# Patient Record
Sex: FEMALE | Race: WHITE | NOT HISPANIC OR LATINO | Employment: UNEMPLOYED | URBAN - METROPOLITAN AREA
[De-identification: names, ages, dates, MRNs, and addresses within clinical notes are randomized per-mention and may not be internally consistent; named-entity substitution may affect disease eponyms.]

---

## 2024-10-04 ENCOUNTER — TELEPHONE (OUTPATIENT)
Dept: PRIMARY CARE | Facility: CLINIC | Age: 59
End: 2024-10-04
Payer: COMMERCIAL

## 2024-10-15 ENCOUNTER — APPOINTMENT (OUTPATIENT)
Dept: LAB | Facility: LAB | Age: 59
End: 2024-10-15
Payer: COMMERCIAL

## 2024-10-15 ENCOUNTER — APPOINTMENT (OUTPATIENT)
Dept: PRIMARY CARE | Facility: CLINIC | Age: 59
End: 2024-10-15
Payer: COMMERCIAL

## 2024-10-15 VITALS
BODY MASS INDEX: 36.57 KG/M2 | SYSTOLIC BLOOD PRESSURE: 117 MMHG | DIASTOLIC BLOOD PRESSURE: 83 MMHG | WEIGHT: 174.2 LBS | HEART RATE: 78 BPM | OXYGEN SATURATION: 96 % | HEIGHT: 58 IN

## 2024-10-15 DIAGNOSIS — G45.9 TIA (TRANSIENT ISCHEMIC ATTACK): Primary | ICD-10-CM

## 2024-10-15 DIAGNOSIS — Z00.00 ROUTINE GENERAL MEDICAL EXAMINATION AT A HEALTH CARE FACILITY: ICD-10-CM

## 2024-10-15 PROCEDURE — 99204 OFFICE O/P NEW MOD 45 MIN: CPT | Performed by: FAMILY MEDICINE

## 2024-10-15 PROCEDURE — 1036F TOBACCO NON-USER: CPT | Performed by: FAMILY MEDICINE

## 2024-10-15 PROCEDURE — 3008F BODY MASS INDEX DOCD: CPT | Performed by: FAMILY MEDICINE

## 2024-10-15 RX ORDER — NAPROXEN SODIUM 220 MG/1
81 TABLET, FILM COATED ORAL ONCE
COMMUNITY
Start: 2024-10-03

## 2024-10-15 RX ORDER — ACETAMINOPHEN 500 MG
2000 TABLET ORAL DAILY
COMMUNITY

## 2024-10-15 RX ORDER — PRAVASTATIN SODIUM 20 MG/1
TABLET ORAL
Qty: 90 TABLET | Refills: 0 | Status: SHIPPED | OUTPATIENT
Start: 2024-10-15 | End: 2024-10-18 | Stop reason: SINTOL

## 2024-10-15 ASSESSMENT — PATIENT HEALTH QUESTIONNAIRE - PHQ9
8. MOVING OR SPEAKING SO SLOWLY THAT OTHER PEOPLE COULD HAVE NOTICED. OR THE OPPOSITE, BEING SO FIGETY OR RESTLESS THAT YOU HAVE BEEN MOVING AROUND A LOT MORE THAN USUAL: NOT AT ALL
SUM OF ALL RESPONSES TO PHQ QUESTIONS 1-9: 6
10. IF YOU CHECKED OFF ANY PROBLEMS, HOW DIFFICULT HAVE THESE PROBLEMS MADE IT FOR YOU TO DO YOUR WORK, TAKE CARE OF THINGS AT HOME, OR GET ALONG WITH OTHER PEOPLE: NOT DIFFICULT AT ALL
SUM OF ALL RESPONSES TO PHQ9 QUESTIONS 1 AND 2: 2
9. THOUGHTS THAT YOU WOULD BE BETTER OFF DEAD, OR OF HURTING YOURSELF: NOT AT ALL
7. TROUBLE CONCENTRATING ON THINGS, SUCH AS READING THE NEWSPAPER OR WATCHING TELEVISION: NOT AT ALL
2. FEELING DOWN, DEPRESSED OR HOPELESS: SEVERAL DAYS
4. FEELING TIRED OR HAVING LITTLE ENERGY: SEVERAL DAYS
3. TROUBLE FALLING OR STAYING ASLEEP OR SLEEPING TOO MUCH: MORE THAN HALF THE DAYS
5. POOR APPETITE OR OVEREATING: SEVERAL DAYS
1. LITTLE INTEREST OR PLEASURE IN DOING THINGS: SEVERAL DAYS
6. FEELING BAD ABOUT YOURSELF - OR THAT YOU ARE A FAILURE OR HAVE LET YOURSELF OR YOUR FAMILY DOWN: NOT AT ALL

## 2024-10-15 NOTE — PROGRESS NOTES
Subjective   Patient ID: Zaida Gustafson is a 58 y.o. female who presents for Establish Care (New patient to establish care wants to discuss mini stroke 10/2/24).    HPI     Patient is here to establish care.     [place] [facility] [place] for  Concern for Stroke/ Transient Ischemic Attack. Patient woke up asymptomatic and began developing symptoms around 6:30 am. Presented with left sided facial droop. She also states she had left sided facial paresthesias and left upper extremity weakness. Per patient she was unable to  with her left hand.  Patient came to [facility] ER.  CT head was negative.  Patient had MRI/MRA brain which was negative for any acute infarct.  Carotid Doppler was negative.  Patient had echocardiogram which was normal.  Neurology was consulted.  Patient symptoms facial droop and left hand weakness were resolved after coming to ER.  Patient was discharged home on 10/3/2024 with aspirin and statin.  Patient started taking atorvastatin was noticing some lip swelling after 2 days so stopped taking atorvastatin.  Taking baby aspirin as prescribed.  Some paresthesias had fully resolved by 10/05.     Patient is here to establish care and hospital discharge follow-up.  Patient now feeling much better.  Denies chest pain shortness of breath headache dizziness palpitation tingling or numbness.  Patient eating drinking well.  No new numbness or weakness.  Patient able to swallow fine.  Blood pressure normal today.    Patient regularly sees a gynecologist Dr. Carvalho who does her pap smear and mammogram. Patient is due for a mammogram.   Colonoscopy done in 2022.    Patient does not currently smoke. Quit in her 30's.     Patients father had colon cancer and multiple strokes.  Patients mother had alzheimer's.  Patients brother had lymphoma.    Past Medical History:   Diagnosis Date    Allergic     Cervicalgia 04/23/2021    Chronic neck and back pain    Headache, unspecified 04/23/2021     "Generalized headaches    Hypertrophy of breast 2021    Macromastia    Other specified health status     No pertinent past medical history    Stroke (Multi) 10/02/2024    Varicella       Past Surgical History:   Procedure Laterality Date    BREAST SURGERY  3/2021     SECTION, LOW TRANSVERSE  1996 &  1997    HYSTERECTOMY  2014    OTHER SURGICAL HISTORY  2021     section    OTHER SURGICAL HISTORY  2021    Hysterectomy    TUBAL LIGATION        Allergies   Allergen Reactions    Sulfa (Sulfonamide Antibiotics) Rash      Family History   Problem Relation Name Age of Onset    Stroke Mother Mom     Colon cancer Father Dad     Stroke Father Dad     Cancer Brother Paulino       Social History     Tobacco Use    Smoking status: Former     Current packs/day: 0.25     Average packs/day: 0.3 packs/day for 15.0 years (3.8 ttl pk-yrs)     Types: Cigarettes    Smokeless tobacco: Never   Vaping Use    Vaping status: Never Used   Substance Use Topics    Alcohol use: Yes     Alcohol/week: 3.0 standard drinks of alcohol     Types: 3 Standard drinks or equivalent per week     Comment: Social drink on weekends not every day    Drug use: Never      Objective   /83   Pulse 78   Ht 1.473 m (4' 10\")   Wt 79 kg (174 lb 3.2 oz)   SpO2 96%   BMI 36.41 kg/m²     Physical Exam    General Appearance:  Alert, cooperative, no distress,   Head:  Normocephalic, atraumatic   Eyes:  PERRL, conjunctiva/corneas clear, EOM's intact,    Lungs:   Clear to auscultation bilaterally, respirations unlabored   Heart:  Regular rate and rhythm, S1 and S2 normal, no murmur,    Neurologic: Alert and oriented x 3, Left sided horizontal nystagmus. CN II-XII intact. Light touch sensation intact. Reflexes 2+. Strength 5/5.     Assessment/Plan   Zaida was seen today for establish care.  Diagnoses and all orders for this visit:  TIA (transient ischemic attack) (Primary)  -     pravastatin (Pravachol) 20 mg tablet; 1/2 tab " once a day for 5 days f/by 1 tab daily  -     Holter or Event Cardiac Monitor; Future  Routine general medical examination at a health care facility  -     TSH with reflex to Free T4 if abnormal; Future  -     Lipid Panel; Future  -     Comprehensive Metabolic Panel; Future  -     CBC; Future  -     Urinalysis with Reflex Microscopic; Future        TIA   Intolerant to Lipitor  Start pravastatin 20 mg daily   Continue Aspirin 81 mg daily   Holter event cardiac monitor; future     Routine medical exam   Routine labs; future   Follow-up with gynecologist for mammogram.    Follow-up in one month    All hospital record including labs and imaging consult note reviewed.  Carotid Doppler normal  Echocardiogram normal  MRI brain negative.    patient was recently seen in the hospital and patient's hospital record has been reviewed with the patient including, but not limited to, discharge summary, labs, imaging, consultation notes (if pertinent). Ambulatory medication list and discharge hospitalization list has been compared and updated for changes.    I spent  45 minutes clinical time with this patient. greater than 50% of this time was spent in counseling and or coordination of care.

## 2024-10-16 ENCOUNTER — LAB (OUTPATIENT)
Dept: LAB | Facility: LAB | Age: 59
End: 2024-10-16
Payer: COMMERCIAL

## 2024-10-16 DIAGNOSIS — Z00.00 ROUTINE GENERAL MEDICAL EXAMINATION AT A HEALTH CARE FACILITY: ICD-10-CM

## 2024-10-16 LAB
ALBUMIN SERPL BCP-MCNC: 4.6 G/DL (ref 3.4–5)
ALP SERPL-CCNC: 82 U/L (ref 33–110)
ALT SERPL W P-5'-P-CCNC: 17 U/L (ref 7–45)
ANION GAP SERPL CALC-SCNC: 11 MMOL/L (ref 10–20)
APPEARANCE UR: CLEAR
AST SERPL W P-5'-P-CCNC: 14 U/L (ref 9–39)
BILIRUB SERPL-MCNC: 0.4 MG/DL (ref 0–1.2)
BILIRUB UR STRIP.AUTO-MCNC: NEGATIVE MG/DL
BUN SERPL-MCNC: 16 MG/DL (ref 6–23)
CALCIUM SERPL-MCNC: 9.5 MG/DL (ref 8.6–10.6)
CHLORIDE SERPL-SCNC: 107 MMOL/L (ref 98–107)
CHOLEST SERPL-MCNC: 174 MG/DL (ref 0–199)
CHOLESTEROL/HDL RATIO: 2.6
CO2 SERPL-SCNC: 29 MMOL/L (ref 21–32)
COLOR UR: ABNORMAL
CREAT SERPL-MCNC: 0.56 MG/DL (ref 0.5–1.05)
EGFRCR SERPLBLD CKD-EPI 2021: >90 ML/MIN/1.73M*2
ERYTHROCYTE [DISTWIDTH] IN BLOOD BY AUTOMATED COUNT: 12.4 % (ref 11.5–14.5)
GLUCOSE SERPL-MCNC: 88 MG/DL (ref 74–99)
GLUCOSE UR STRIP.AUTO-MCNC: NORMAL MG/DL
HCT VFR BLD AUTO: 42.2 % (ref 36–46)
HDLC SERPL-MCNC: 66.2 MG/DL
HGB BLD-MCNC: 13.6 G/DL (ref 12–16)
KETONES UR STRIP.AUTO-MCNC: NEGATIVE MG/DL
LDLC SERPL CALC-MCNC: 90 MG/DL
LEUKOCYTE ESTERASE UR QL STRIP.AUTO: ABNORMAL
MCH RBC QN AUTO: 29.8 PG (ref 26–34)
MCHC RBC AUTO-ENTMCNC: 32.2 G/DL (ref 32–36)
MCV RBC AUTO: 93 FL (ref 80–100)
NITRITE UR QL STRIP.AUTO: NEGATIVE
NON HDL CHOLESTEROL: 108 MG/DL (ref 0–149)
NRBC BLD-RTO: 0 /100 WBCS (ref 0–0)
PH UR STRIP.AUTO: 6 [PH]
PLATELET # BLD AUTO: 316 X10*3/UL (ref 150–450)
POTASSIUM SERPL-SCNC: 4.2 MMOL/L (ref 3.5–5.3)
PROT SERPL-MCNC: 7.1 G/DL (ref 6.4–8.2)
PROT UR STRIP.AUTO-MCNC: NEGATIVE MG/DL
RBC # BLD AUTO: 4.56 X10*6/UL (ref 4–5.2)
RBC # UR STRIP.AUTO: NEGATIVE /UL
RBC #/AREA URNS AUTO: NORMAL /HPF
SODIUM SERPL-SCNC: 143 MMOL/L (ref 136–145)
SP GR UR STRIP.AUTO: 1.02
TRIGL SERPL-MCNC: 89 MG/DL (ref 0–149)
TSH SERPL-ACNC: 0.95 MIU/L (ref 0.44–3.98)
UROBILINOGEN UR STRIP.AUTO-MCNC: NORMAL MG/DL
VLDL: 18 MG/DL (ref 0–40)
WBC # BLD AUTO: 5.6 X10*3/UL (ref 4.4–11.3)
WBC #/AREA URNS AUTO: NORMAL /HPF

## 2024-10-16 PROCEDURE — 80053 COMPREHEN METABOLIC PANEL: CPT

## 2024-10-16 PROCEDURE — 80061 LIPID PANEL: CPT

## 2024-10-16 PROCEDURE — 36415 COLL VENOUS BLD VENIPUNCTURE: CPT

## 2024-10-16 PROCEDURE — 84443 ASSAY THYROID STIM HORMONE: CPT

## 2024-10-16 PROCEDURE — 81001 URINALYSIS AUTO W/SCOPE: CPT

## 2024-10-16 PROCEDURE — 85027 COMPLETE CBC AUTOMATED: CPT

## 2024-10-17 ENCOUNTER — APPOINTMENT (OUTPATIENT)
Dept: PRIMARY CARE | Facility: CLINIC | Age: 59
End: 2024-10-17
Payer: COMMERCIAL

## 2024-10-18 ENCOUNTER — OFFICE VISIT (OUTPATIENT)
Dept: PRIMARY CARE | Facility: CLINIC | Age: 59
End: 2024-10-18
Payer: COMMERCIAL

## 2024-10-18 ENCOUNTER — TELEPHONE (OUTPATIENT)
Dept: PRIMARY CARE | Facility: CLINIC | Age: 59
End: 2024-10-18

## 2024-10-18 VITALS
SYSTOLIC BLOOD PRESSURE: 128 MMHG | BODY MASS INDEX: 35.74 KG/M2 | WEIGHT: 171 LBS | OXYGEN SATURATION: 98 % | HEART RATE: 86 BPM | DIASTOLIC BLOOD PRESSURE: 74 MMHG

## 2024-10-18 DIAGNOSIS — E78.49 OTHER HYPERLIPIDEMIA: ICD-10-CM

## 2024-10-18 DIAGNOSIS — R68.89 FLU-LIKE SYMPTOMS: ICD-10-CM

## 2024-10-18 DIAGNOSIS — E78.00 HIGH CHOLESTEROL: ICD-10-CM

## 2024-10-18 DIAGNOSIS — G45.9 TIA (TRANSIENT ISCHEMIC ATTACK): Primary | ICD-10-CM

## 2024-10-18 DIAGNOSIS — L50.9 HIVES: ICD-10-CM

## 2024-10-18 LAB
NON-UH HIE COVID-19 MOLECULAR SWED: NEGATIVE
NON-UH HIE RAPID INFLUENZA A ANTIGEN TEST: NEGATIVE
NON-UH HIE RAPID INFLUENZA B ANTIGEN TEST: NEGATIVE
NON-UH HIE RFAB INT QC: PRESENT

## 2024-10-18 PROCEDURE — 99214 OFFICE O/P EST MOD 30 MIN: CPT | Performed by: FAMILY MEDICINE

## 2024-10-18 RX ORDER — BEMPEDOIC ACID 180 MG/1
180 TABLET, FILM COATED ORAL DAILY
Qty: 90 TABLET | Refills: 0 | Status: SHIPPED | OUTPATIENT
Start: 2024-10-18

## 2024-10-18 ASSESSMENT — PATIENT HEALTH QUESTIONNAIRE - PHQ9
10. IF YOU CHECKED OFF ANY PROBLEMS, HOW DIFFICULT HAVE THESE PROBLEMS MADE IT FOR YOU TO DO YOUR WORK, TAKE CARE OF THINGS AT HOME, OR GET ALONG WITH OTHER PEOPLE: NOT DIFFICULT AT ALL
2. FEELING DOWN, DEPRESSED OR HOPELESS: SEVERAL DAYS
1. LITTLE INTEREST OR PLEASURE IN DOING THINGS: NOT AT ALL
SUM OF ALL RESPONSES TO PHQ9 QUESTIONS 1 AND 2: 1

## 2024-10-18 NOTE — TELEPHONE ENCOUNTER
PT thinks she is having a reaction to pravastatin (Pravachol) 20 mg tablet.    Sore throat yesterday and today not one.  Rash around breast area.    Please advise?

## 2024-10-18 NOTE — PROGRESS NOTES
Subjective   Patient ID: Zaida Gustafson is a 58 y.o. female who presents for No chief complaint on file.    HPI     Patient is here for rash.   Patient woke up with pruritus and noticed a rash on her left anterior rib cage. Took a Claritin this morning with minimal symptom relief. No seasonal allergies. Rash does not burn, tingle, or ache.   Recently started on pravastatin 20 mg daily after she experienced lip swelling with atorvastatin. Had taken two half doses of the medication prior to symptom onset.     Also had throat pain, sneezing, congestion, rhinorrhea, fatigue, and myalgias that started yesterday. No sore throat today. Denies cough, n/v/d.       Previous history  TIA - Patient was admited to [facility] [place] [place] on 10/02/2023 for  Concern for Stroke/ Transient Ischemic Attack. Patient woke up asymptomatic and began developing symptoms around 6:30 am. Presented with left sided facial droop. She also states she had left sided facial paresthesias and left upper extremity weakness. Per patient she was unable to  with her left hand.  Patient came to [facility] ER.  CT head was negative.  Patient had MRI/MRA brain which was negative for any acute infarct.  Carotid Doppler was negative.  Patient had echocardiogram which was normal.  Neurology was consulted.  Patient symptoms facial droop and left hand weakness were resolved after coming to ER.  Patient was discharged home on 10/3/2024 with aspirin and statin.  Patient started taking atorvastatin was noticing some lip swelling after 2 days so stopped taking atorvastatin.  Taking baby aspirin as prescribed.  Some paresthesias had fully resolved by 10/05.   Denies chest pain shortness of breath headache dizziness palpitation tingling or numbness.  Patient eating drinking well.  No new numbness or weakness.  Patient able to swallow fine.  Blood pressure normal today.    Patient regularly sees a gynecologist Dr. Carvalho who does her pap smear and mammogram. Patient is due for a  mammogram.   Colonoscopy done in .    Patient does not currently smoke. Quit in her 30's.     Patients father had colon cancer and multiple strokes.  Patients mother had alzheimer's.  Patients brother had lymphoma.    Past Medical History:   Diagnosis Date    Allergic     Cervicalgia 2021    Chronic neck and back pain    Headache, unspecified 2021    Generalized headaches    Hypertrophy of breast 2021    Macromastia    Other specified health status     No pertinent past medical history    Stroke (Multi) 10/02/2024    Varicella       Past Surgical History:   Procedure Laterality Date    BREAST SURGERY  3/2021     SECTION, LOW TRANSVERSE  1996 &  1997    HYSTERECTOMY  2014    OTHER SURGICAL HISTORY  2021     section    OTHER SURGICAL HISTORY  2021    Hysterectomy    TUBAL LIGATION        Allergies   Allergen Reactions    Sulfa (Sulfonamide Antibiotics) Rash      Family History   Problem Relation Name Age of Onset    Stroke Mother Mom     Colon cancer Father Dad     Stroke Father Dad     Cancer Brother Paulino       Social History     Tobacco Use    Smoking status: Former     Current packs/day: 0.25     Average packs/day: 0.3 packs/day for 15.0 years (3.8 ttl pk-yrs)     Types: Cigarettes    Smokeless tobacco: Never   Vaping Use    Vaping status: Never Used   Substance Use Topics    Alcohol use: Yes     Alcohol/week: 3.0 standard drinks of alcohol     Types: 3 Standard drinks or equivalent per week     Comment: Social drink on weekends not every day    Drug use: Never      Objective   There were no vitals taken for this visit.    Physical Exam    General Appearance:  Alert, cooperative, no distress,   Head:  Normocephalic, atraumatic   Eyes:  PERRL, conjunctiva/corneas clear, EOM's intact,    Lungs:   Clear to auscultation bilaterally, respirations unlabored   Heart:  Regular rate and rhythm, S1 and S2 normal, no murmur,    Neurologic: Normal   Dermatologic:   Multiple pink wheals on the      Assessment/Plan   Zaida was seen today for rash.  Diagnoses and all orders for this visit:  TIA (transient ischemic attack) (Primary)  -     bempedoic acid (Nexletol) 180 mg tablet; Take 180 mg by mouth once daily.  Other hyperlipidemia  -     bempedoic acid (Nexletol) 180 mg tablet; Take 180 mg by mouth once daily.  Flu-like symptoms  Hives        URI symptoms   COVID PCR; Future  Influenza PCR; Future    Urticaria   Pt allergic to STATIN with Lip swelling  and Hives  Discontinue pravastatin  Continue OTC antihistamine    TIA   Intolerant/ allergic to Lipitor and pravastatin  Start Nexletol 180 mg  Continue Aspirin 81 mg daily   Holter event cardiac monitor; future       Follow-up in one month

## 2024-11-13 ENCOUNTER — APPOINTMENT (OUTPATIENT)
Dept: PRIMARY CARE | Facility: CLINIC | Age: 59
End: 2024-11-13
Payer: COMMERCIAL

## 2024-11-13 VITALS
SYSTOLIC BLOOD PRESSURE: 119 MMHG | DIASTOLIC BLOOD PRESSURE: 80 MMHG | HEART RATE: 83 BPM | OXYGEN SATURATION: 95 % | WEIGHT: 176.6 LBS | HEIGHT: 58 IN | BODY MASS INDEX: 37.07 KG/M2

## 2024-11-13 DIAGNOSIS — Z00.00 ROUTINE GENERAL MEDICAL EXAMINATION AT A HEALTH CARE FACILITY: Primary | ICD-10-CM

## 2024-11-13 DIAGNOSIS — G45.9 TIA (TRANSIENT ISCHEMIC ATTACK): ICD-10-CM

## 2024-11-13 DIAGNOSIS — E78.49 OTHER HYPERLIPIDEMIA: ICD-10-CM

## 2024-11-13 DIAGNOSIS — Z12.31 ENCOUNTER FOR SCREENING MAMMOGRAM FOR BREAST CANCER: ICD-10-CM

## 2024-11-13 PROCEDURE — 90471 IMMUNIZATION ADMIN: CPT | Performed by: FAMILY MEDICINE

## 2024-11-13 PROCEDURE — 99396 PREV VISIT EST AGE 40-64: CPT | Performed by: FAMILY MEDICINE

## 2024-11-13 PROCEDURE — 1036F TOBACCO NON-USER: CPT | Performed by: FAMILY MEDICINE

## 2024-11-13 PROCEDURE — 3008F BODY MASS INDEX DOCD: CPT | Performed by: FAMILY MEDICINE

## 2024-11-13 PROCEDURE — 90688 IIV4 VACCINE SPLT 0.5 ML IM: CPT | Performed by: FAMILY MEDICINE

## 2024-11-13 RX ORDER — NITROFURANTOIN 25; 75 MG/1; MG/1
100 CAPSULE ORAL
COMMUNITY
Start: 2024-10-28

## 2024-11-13 ASSESSMENT — PATIENT HEALTH QUESTIONNAIRE - PHQ9
SUM OF ALL RESPONSES TO PHQ9 QUESTIONS 1 AND 2: 4
5. POOR APPETITE OR OVEREATING: SEVERAL DAYS
9. THOUGHTS THAT YOU WOULD BE BETTER OFF DEAD, OR OF HURTING YOURSELF: NOT AT ALL
6. FEELING BAD ABOUT YOURSELF - OR THAT YOU ARE A FAILURE OR HAVE LET YOURSELF OR YOUR FAMILY DOWN: NOT AT ALL
10. IF YOU CHECKED OFF ANY PROBLEMS, HOW DIFFICULT HAVE THESE PROBLEMS MADE IT FOR YOU TO DO YOUR WORK, TAKE CARE OF THINGS AT HOME, OR GET ALONG WITH OTHER PEOPLE: SOMEWHAT DIFFICULT
4. FEELING TIRED OR HAVING LITTLE ENERGY: MORE THAN HALF THE DAYS
8. MOVING OR SPEAKING SO SLOWLY THAT OTHER PEOPLE COULD HAVE NOTICED. OR THE OPPOSITE, BEING SO FIGETY OR RESTLESS THAT YOU HAVE BEEN MOVING AROUND A LOT MORE THAN USUAL: NOT AT ALL
1. LITTLE INTEREST OR PLEASURE IN DOING THINGS: MORE THAN HALF THE DAYS
2. FEELING DOWN, DEPRESSED OR HOPELESS: MORE THAN HALF THE DAYS
3. TROUBLE FALLING OR STAYING ASLEEP OR SLEEPING TOO MUCH: MORE THAN HALF THE DAYS
7. TROUBLE CONCENTRATING ON THINGS, SUCH AS READING THE NEWSPAPER OR WATCHING TELEVISION: MORE THAN HALF THE DAYS
SUM OF ALL RESPONSES TO PHQ QUESTIONS 1-9: 11

## 2024-11-13 NOTE — PROGRESS NOTES
Subjective   Patient ID: Zaida Gustafson is a 58 y.o. female who presents for Follow-up (physical)    HPI     Patient is here for complete physical.  Patient currently trying to watch diet not exercising  Medication-h/o TIA patient taking baby aspirin 81 mg, allergic to Statin   Supplements- turmeric and ty  Denies smoking/ alcohol or drug use.   Patient does not currently smoke. Quit in her 30's.  Colonoscopy- Colonoscopy done in 2022.  Patient regularly sees a gynecologist Dr. Carvalho who does her pap smear and mammogram. Patient is due for a mammogram. Order given.  Pt had Hysterectomy  at age 43  Patient was advised to take calcium and vitamin D twice daily.        Previous history  TIA - Patient was admited to [facility] [place] on 10/02/2023 for  Concern for Stroke/ Transient Ischemic Attack. Patient woke up asymptomatic and began developing symptoms around 6:30 am. Presented with left sided facial droop. She also states she had left sided facial paresthesias and left upper extremity weakness. Per patient she was unable to  with her left hand.  Patient came to [facility] ER.  CT head was negative.  Patient had MRI/MRA brain which was negative for any acute infarct.  Carotid Doppler was negative.  Patient had echocardiogram which was normal.  Neurology was consulted.  Patient symptoms facial droop and left hand weakness were resolved after coming to ER.  Patient was discharged home on 10/3/2024 with aspirin and statin.  Patient started taking atorvastatin was noticing some lip swelling after 2 days so stopped taking atorvastatin.  Taking baby aspirin as prescribed.  Some paresthesias had fully resolved by 10/05.   Patients father had colon cancer and multiple strokes.  Patients mother had alzheimer's.  Patients brother had lymphoma.    Past Medical History:   Diagnosis Date    Allergic     Cervicalgia 04/23/2021    Chronic neck and back pain    Headache, unspecified 04/23/2021    Generalized headaches    Hypertrophy of  breast 2021    Macromastia    Other specified health status     No pertinent past medical history    Stroke (Multi) 10/02/2024    Varicella       Past Surgical History:   Procedure Laterality Date    BREAST SURGERY  3/2021     SECTION, LOW TRANSVERSE  1996 &  1997    HYSTERECTOMY  2014    OTHER SURGICAL HISTORY  2021     section    OTHER SURGICAL HISTORY  2021    Hysterectomy    TUBAL LIGATION        Allergies   Allergen Reactions    Statins-Hmg-Coa Reductase Inhibitors Hives    Sulfa (Sulfonamide Antibiotics) Rash and Hives      Family History   Problem Relation Name Age of Onset    Stroke Mother Mom     Colon cancer Father Dad     Stroke Father Dad     Cancer Brother Paulino       Social History     Tobacco Use    Smoking status: Former     Current packs/day: 0.25     Average packs/day: 0.3 packs/day for 15.0 years (3.8 ttl pk-yrs)     Types: Cigarettes    Smokeless tobacco: Never   Vaping Use    Vaping status: Never Used   Substance Use Topics    Alcohol use: Not Currently     Alcohol/week: 3.0 standard drinks of alcohol     Types: 3 Standard drinks or equivalent per week     Comment: Social drink on weekends not every day    Drug use: Never      Current Outpatient Medications on File Prior to Visit   Medication Sig Dispense Refill    aspirin 81 mg chewable tablet Chew 1 tablet (81 mg) 1 time.      cholecalciferol (Vitamin D3) 50 mcg (2,000 unit) capsule Take 1 capsule (50 mcg) by mouth early in the morning..      nitrofurantoin, macrocrystal-monohydrate, (Macrobid) 100 mg capsule Take 1 capsule (100 mg) by mouth.      bempedoic acid (Nexletol) 180 mg tablet Take 180 mg by mouth once daily. (Patient not taking: Reported on 2024) 90 tablet 0    [DISCONTINUED] bempedoic acid 180 mg tablet Take 180 mg by mouth once daily. (Patient not taking: Reported on 2024) 90 tablet 1     No current facility-administered medications on file prior to visit.       Objective   BP  "119/80   Pulse 83   Ht 1.473 m (4' 10\")   Wt 80.1 kg (176 lb 9.6 oz)   SpO2 95%   BMI 36.91 kg/m²     Physical Exam    General    Alert, oriented x 3 ,cooperative, no distress,    Head:    Normocephalic, without obvious abnormality, atraumatic   Eyes:    PERRL, conjunctiva/corneas clear, EOM's intact,    Neck:   Supple, symmetrical, No enlargement   Back:     Symmetric, no curvature, ROM normal, no CVA tenderness   Lungs:     Clear to auscultation bilaterally, respirations normal ,no wheezing or ronchi   Heart:    Regular rate and rhythm, S1 and S2 normal, no murmur, rub or gallop   Abdomen:     Soft, non-tender, bowel sounds active all four quadrants, no masses,   no organomegaly   Lymph nodes:  SKIN   Cervical Lymphnodes normal  Normal color , no rashes   Neurologic:   Gait normal strength, sensation normal       Lab Results   Component Value Date    TSH 0.95 10/16/2024       Lipid Profile:  Lab Results   Component Value Date    TRIG 89 10/16/2024    HDL 66.2 10/16/2024    LDLCALC 90 10/16/2024     Immunization History   Administered Date(s) Administered    Influenza, injectable, quadrivalent 12/02/2021, 10/10/2022, 10/11/2023    MMR vaccine, subcutaneous (MMR II) 12/09/2021, 02/04/2022    Pfizer Purple Cap SARS-CoV-2 03/19/2021, 04/09/2021, 11/30/2021    Zoster vaccine, recombinant, adult (SHINGRIX) 05/07/2022, 08/24/2022       Assessment/Plan     Zaida was seen today for follow-up.  Diagnoses and all orders for this visit:  Routine general medical examination at a health care facility (Primary)  Encounter for screening mammogram for breast cancer  -     BI mammo bilateral screening; Future  Other hyperlipidemia  TIA (transient ischemic attack)    Urticaria -resolved  Pt allergic to STATIN with Lip swelling  and Hives  Off  pravastatin      TIA   Intolerant/ allergic to Lipitor and pravastatin  PA for Nexletol 180 mg denied  Continue Aspirin 81 mg daily   Patient has cardiac monitor for 30 days.    " Advised to follow-up with cardiology.    Labs From October 2024 reviewed.  Colonoscopy up-to-date 2022  Patient following Dr. Gates  Get mammogram  Patient following GYN Dr. Martinez for Pap smear  Declined flu shot  Had Tdap 5 years ago  Shingrix up-to-date.    Follow-up in 3-4 months

## 2024-11-21 DIAGNOSIS — G45.9 TIA (TRANSIENT ISCHEMIC ATTACK): ICD-10-CM

## 2024-11-21 DIAGNOSIS — E78.49 OTHER HYPERLIPIDEMIA: ICD-10-CM

## 2024-11-21 RX ORDER — BEMPEDOIC ACID 180 MG/1
180 TABLET, FILM COATED ORAL DAILY
Qty: 90 TABLET | Refills: 1 | Status: SHIPPED | OUTPATIENT
Start: 2024-11-21

## 2024-11-27 DIAGNOSIS — G45.9 TIA (TRANSIENT ISCHEMIC ATTACK): ICD-10-CM

## 2025-01-15 ENCOUNTER — APPOINTMENT (OUTPATIENT)
Dept: PRIMARY CARE | Facility: CLINIC | Age: 60
End: 2025-01-15
Payer: COMMERCIAL

## 2025-01-15 ENCOUNTER — LAB (OUTPATIENT)
Dept: LAB | Facility: LAB | Age: 60
End: 2025-01-15
Payer: COMMERCIAL

## 2025-01-15 VITALS
HEART RATE: 84 BPM | DIASTOLIC BLOOD PRESSURE: 72 MMHG | OXYGEN SATURATION: 96 % | BODY MASS INDEX: 37.87 KG/M2 | HEIGHT: 58 IN | WEIGHT: 180.4 LBS | SYSTOLIC BLOOD PRESSURE: 105 MMHG

## 2025-01-15 DIAGNOSIS — G45.9 TIA (TRANSIENT ISCHEMIC ATTACK): Primary | ICD-10-CM

## 2025-01-15 DIAGNOSIS — E78.49 OTHER HYPERLIPIDEMIA: ICD-10-CM

## 2025-01-15 DIAGNOSIS — E66.09 OBESITY DUE TO EXCESS CALORIES WITH SERIOUS COMORBIDITY, UNSPECIFIED CLASS: ICD-10-CM

## 2025-01-15 DIAGNOSIS — R73.02 IGT (IMPAIRED GLUCOSE TOLERANCE): ICD-10-CM

## 2025-01-15 LAB
EST. AVERAGE GLUCOSE BLD GHB EST-MCNC: 97 MG/DL
HBA1C MFR BLD: 5 %

## 2025-01-15 PROCEDURE — 99214 OFFICE O/P EST MOD 30 MIN: CPT | Performed by: FAMILY MEDICINE

## 2025-01-15 PROCEDURE — 83036 HEMOGLOBIN GLYCOSYLATED A1C: CPT

## 2025-01-15 PROCEDURE — G0447 BEHAVIOR COUNSEL OBESITY 15M: HCPCS | Performed by: FAMILY MEDICINE

## 2025-01-15 PROCEDURE — 3008F BODY MASS INDEX DOCD: CPT | Performed by: FAMILY MEDICINE

## 2025-01-15 PROCEDURE — 1036F TOBACCO NON-USER: CPT | Performed by: FAMILY MEDICINE

## 2025-01-15 RX ORDER — LANOLIN ALCOHOL/MO/W.PET/CERES
50 CREAM (GRAM) TOPICAL DAILY
COMMUNITY

## 2025-01-15 RX ORDER — EZETIMIBE 10 MG/1
1 TABLET ORAL
COMMUNITY
Start: 2025-01-06

## 2025-01-15 ASSESSMENT — PATIENT HEALTH QUESTIONNAIRE - PHQ9
SUM OF ALL RESPONSES TO PHQ9 QUESTIONS 1 AND 2: 0
2. FEELING DOWN, DEPRESSED OR HOPELESS: NOT AT ALL
1. LITTLE INTEREST OR PLEASURE IN DOING THINGS: NOT AT ALL

## 2025-01-15 NOTE — PROGRESS NOTES
Subjective   Patient ID: Zaida Gustafson is a 59 y.o. female who presents for Follow-up    HPI     Patient is here for follow up.  Pt had stress test and Holter monitoring done with Cardiology Dr. Napoles  Also had stress test  which was normal.   Patient currently trying to watch diet.    Doing Yoga and cardio for 45 mins daily for last  1-2 months.  Avoiding Fried food and avoiding carbs and eating more salads.  Patient would like to lose weight.  Asking for Ozempic.  Medication-h/o TIA patient taking baby aspirin 81 mg, allergic to Statin   Supplements- turmeric and ty      Previous history  TIA - Patient was [place]ited [place] [facility] [place] for  Concern for Stroke/ Transient Ischemic Attack. Patient woke up asymptomatic and began developing symptoms around 6:30 am. Presented with left sided facial droop. She also states she had left sided facial paresthesias and left upper extremity weakness. Per patient she was unable to  with her left hand.  Patient came to [facility] ER.  CT head was negative.  Patient had MRI/MRA brain which was negative for any acute infarct.  Carotid Doppler was negative.  Patient had echocardiogram which was normal.  Neurology was consulted.  Patient symptoms facial droop and left hand weakness were resolved after coming to ER.  Patient was discharged home on 10/3/2024 with aspirin and statin.  Patient started taking atorvastatin was noticing some lip swelling after 2 days so stopped taking atorvastatin.  Taking baby aspirin as prescribed.  Some paresthesias had fully resolved by 10/05.   Patients father had colon cancer and multiple strokes.  Patients mother had alzheimer's.  Patients brother had lymphoma.    Past Medical History:   Diagnosis Date    Allergic     Cervicalgia 04/23/2021    Chronic neck and back pain    Headache, unspecified 04/23/2021    Generalized headaches    Hypertrophy of breast 04/23/2021    Macromastia    Other specified health status     No pertinent past medical  history    Stroke (Multi) 10/02/2024    Varicella       Past Surgical History:   Procedure Laterality Date    BREAST SURGERY  3/2021     SECTION, LOW TRANSVERSE  1996 &  1997    HYSTERECTOMY  2014    OTHER SURGICAL HISTORY  2021     section    OTHER SURGICAL HISTORY  2021    Hysterectomy    TUBAL LIGATION        Allergies   Allergen Reactions    Statins-Hmg-Coa Reductase Inhibitors Hives    Sulfa (Sulfonamide Antibiotics) Rash and Hives      Family History   Problem Relation Name Age of Onset    Stroke Mother Mom     Colon cancer Father Dad     Stroke Father Dad     Cancer Brother Paulino       Social History     Tobacco Use    Smoking status: Former     Current packs/day: 0.25     Average packs/day: 0.3 packs/day for 15.0 years (3.8 ttl pk-yrs)     Types: Cigarettes    Smokeless tobacco: Never   Vaping Use    Vaping status: Never Used   Substance Use Topics    Alcohol use: Yes     Alcohol/week: 3.0 standard drinks of alcohol     Types: 3 Standard drinks or equivalent per week     Comment: Social drink on weekends not every day    Drug use: Never      Current Outpatient Medications on File Prior to Visit   Medication Sig Dispense Refill    aspirin 81 mg chewable tablet Chew 1 tablet (81 mg) 1 time.      cholecalciferol (Vitamin D3) 50 mcg (2,000 unit) capsule Take 1 capsule (50 mcg) by mouth early in the morning..      ezetimibe (Zetia) 10 mg tablet Take 1 tablet (10 mg) by mouth early in the morning..      nitrofurantoin, macrocrystal-monohydrate, (Macrobid) 100 mg capsule Take 1 capsule (100 mg) by mouth. (Patient taking differently: Take 1 capsule (100 mg) by mouth if needed.)      pyridoxine (Vitamin B-6) 50 mg tablet Take 1 tablet (50 mg) by mouth once daily.      bempedoic acid (Nexletol) 180 mg tablet Take 180 mg by mouth once daily. (Patient not taking: Reported on 1/15/2025) 90 tablet 1     No current facility-administered medications on file prior to visit.       Objective  "  /72   Pulse 84   Ht 1.473 m (4' 10\")   Wt 81.8 kg (180 lb 6.4 oz)   SpO2 96%   BMI 37.70 kg/m²       General Appearance:  Alert, cooperative, no distress,   Head:  Normocephalic, atraumatic   Eyes:  PERRL, conjunctiva/corneas clear, EOM's intact,    Lungs:   Clear to auscultation bilaterally, respirations unlabored   Heart:  Regular rate and rhythm, S1 and S2 normal, no murmur,    Extremities: Extremities normal, No edema   Neurologic: Normal         Lab Results   Component Value Date    TSH 0.95 10/16/2024       Lipid Profile:  Lab Results   Component Value Date    TRIG 89 10/16/2024    HDL 66.2 10/16/2024    LDLCALC 90 10/16/2024     Immunization History   Administered Date(s) Administered    COVID-19, mRNA, LNP-S, PF, 30 mcg/0.3 mL dose 03/19/2021, 04/09/2021, 11/30/2021    Influenza, injectable, quadrivalent 12/02/2021, 10/10/2022, 10/11/2023    MMR vaccine, subcutaneous (MMR II) 12/09/2021, 02/04/2022    Zoster vaccine, recombinant, adult (SHINGRIX) 05/07/2022, 08/24/2022       Assessment/Plan     Zaida was seen today for follow-up.  Diagnoses and all orders for this visit:  TIA (transient ischemic attack) (Primary)  Other hyperlipidemia  BMI 37.0-37.9, adult  -     Hemoglobin A1c; Future  -     semaglutide, weight loss, 0.5 mg/0.5 mL pen injector; Inject 0.5 mg under the skin every 7 days.  Obesity due to excess calories with serious comorbidity, unspecified class  -     Hemoglobin A1c; Future  -     semaglutide, weight loss, 0.5 mg/0.5 mL pen injector; Inject 0.5 mg under the skin every 7 days.  IGT (impaired glucose tolerance)  -     Hemoglobin A1c; Future    Patient was advised to continue diet and exercise  Will start semaglutide 0.5 mg once a week  Reevaluate for weight in 1 month      TIA   Intolerant/ allergic to Lipitor and pravastatin  PA for Nexletol 180 mg denied  Patient was advised to take Zetia 10 mg daily by cardiology  Recent event monitor was normal  Echocardiogram was normal " with EF of 55 to 60%  Stress test normal  Plan for loop recorder  Continue Aspirin 81 mg daily   follow-up with cardiology.    Labs From October 2024 reviewed.  Colonoscopy up-to-date 2022  Patient following Dr. Gates  Get mammogram  Patient following GYN Dr. Martinez for Pap smear  Declined flu shot  Had Tdap 5 years ago  Shingrix up-to-date.    Follow-up in 3-4 months      Colonoscopy- Colonoscopy done in 2022.  Patient regularly sees a gynecologist Dr. Carvalho who does her pap smear and mammogram.   Patient is due for a mammogram.   Pt had Hysterectomy  at age 43     I spent 15 minutes on obesity councelling. Pt was advised to loose weight. Discussed at length about various ways of loosing weight including healthy Diet, daily Aerobic exercise, calorie counting bariatric surgery, calorie deficit with portion control method, and medications. recommend patient maintain a diet of  1500 calories.

## 2025-01-16 DIAGNOSIS — E66.09 OBESITY DUE TO EXCESS CALORIES WITH SERIOUS COMORBIDITY, UNSPECIFIED CLASS: ICD-10-CM

## 2025-01-29 ENCOUNTER — APPOINTMENT (OUTPATIENT)
Dept: PRIMARY CARE | Facility: CLINIC | Age: 60
End: 2025-01-29
Payer: COMMERCIAL

## 2025-01-29 VITALS
SYSTOLIC BLOOD PRESSURE: 111 MMHG | WEIGHT: 181.6 LBS | DIASTOLIC BLOOD PRESSURE: 70 MMHG | OXYGEN SATURATION: 95 % | BODY MASS INDEX: 38.12 KG/M2 | HEIGHT: 58 IN | HEART RATE: 76 BPM

## 2025-01-29 DIAGNOSIS — G45.9 TIA (TRANSIENT ISCHEMIC ATTACK): ICD-10-CM

## 2025-01-29 DIAGNOSIS — E78.49 OTHER HYPERLIPIDEMIA: Primary | ICD-10-CM

## 2025-01-29 DIAGNOSIS — E66.09 OBESITY DUE TO EXCESS CALORIES WITH SERIOUS COMORBIDITY, UNSPECIFIED CLASS: ICD-10-CM

## 2025-01-29 PROCEDURE — 3008F BODY MASS INDEX DOCD: CPT | Performed by: FAMILY MEDICINE

## 2025-01-29 PROCEDURE — 99213 OFFICE O/P EST LOW 20 MIN: CPT | Performed by: FAMILY MEDICINE

## 2025-01-29 PROCEDURE — 1036F TOBACCO NON-USER: CPT | Performed by: FAMILY MEDICINE

## 2025-01-29 PROCEDURE — G0447 BEHAVIOR COUNSEL OBESITY 15M: HCPCS | Performed by: FAMILY MEDICINE

## 2025-01-29 RX ORDER — BUPROPION HYDROCHLORIDE 150 MG/1
150 TABLET, EXTENDED RELEASE ORAL 2 TIMES DAILY
Qty: 180 TABLET | Refills: 0 | Status: SHIPPED | OUTPATIENT
Start: 2025-01-29 | End: 2025-03-30

## 2025-01-29 ASSESSMENT — PATIENT HEALTH QUESTIONNAIRE - PHQ9
1. LITTLE INTEREST OR PLEASURE IN DOING THINGS: NOT AT ALL
SUM OF ALL RESPONSES TO PHQ9 QUESTIONS 1 AND 2: 0
2. FEELING DOWN, DEPRESSED OR HOPELESS: NOT AT ALL

## 2025-01-29 NOTE — PROGRESS NOTES
Subjective   Patient ID: Zaida Gustafson is a 59 y.o. female who presents for Follow-up    HPI     Patient is here for follow up on weight  Pt had stress test and Holter monitoring done with Cardiology Dr. aNpoles  Also had stress test  which was normal.   Doing Yoga and cardio for 45 mins daily for last  1-2 months.  Avoiding Fried food and avoiding carbs and eating more salads.  Patient would like to lose weight.  Ozempic was denied by insurance.  Patient had a pharmacy consultation who suggested possible Wellbutrin for weight loss.  Patient is willing to consider Wellbutrin.  Patient mentions that she is noticing trouble staying asleep waking up 1-2 times at between 1 to 3 AM.  Patient not feeling refreshed during the day more tired.  Sleeping for 1 to 2 hours during the day.  Medication-h/o TIA patient taking baby aspirin 81 mg, allergic to Statin   Supplements- turmeric and ty      Previous history  TIA - Patient was admited to [facility] on 10/02/2024 for  Concern for Stroke/ Transient Ischemic Attack. Patient woke up asymptomatic and began developing symptoms around 6:30 am. Presented with left sided facial droop. She also states she had left sided facial paresthesias and left upper extremity weakness. Per patient she was unable to  with her left hand.  Patient came to [facility] ER.  CT head was negative.  Patient had MRI/MRA brain which was negative for any acute infarct.  Carotid Doppler was negative.  Patient had echocardiogram which was normal.  Neurology was consulted.  Patient symptoms facial droop and left hand weakness were resolved after coming to ER.  Patient was discharged home on 10/3/2024 with aspirin and statin.  Patients father had colon cancer and multiple strokes.  .    Past Medical History:   Diagnosis Date    Allergic     Cervicalgia 04/23/2021    Chronic neck and back pain    Headache, unspecified 04/23/2021    Generalized headaches    Hypertrophy of breast 04/23/2021    Macromastia     Other specified health status     No pertinent past medical history    Stroke (Multi) 10/02/2024    Varicella       Past Surgical History:   Procedure Laterality Date    BREAST SURGERY  3/2021     SECTION, LOW TRANSVERSE  1996 &  1997    HYSTERECTOMY  2014    OTHER SURGICAL HISTORY  2021     section    OTHER SURGICAL HISTORY  2021    Hysterectomy    TUBAL LIGATION        Allergies   Allergen Reactions    Statins-Hmg-Coa Reductase Inhibitors Hives    Sulfa (Sulfonamide Antibiotics) Rash and Hives      Family History   Problem Relation Name Age of Onset    Stroke Mother Mom     Colon cancer Father Dad     Stroke Father Dad     Cancer Brother Paulino       Social History     Tobacco Use    Smoking status: Former     Current packs/day: 0.25     Average packs/day: 0.3 packs/day for 15.0 years (3.8 ttl pk-yrs)     Types: Cigarettes    Smokeless tobacco: Never   Vaping Use    Vaping status: Never Used   Substance Use Topics    Alcohol use: Yes     Alcohol/week: 3.0 standard drinks of alcohol     Types: 3 Standard drinks or equivalent per week     Comment: Social drink on weekends not every day    Drug use: Never      Current Outpatient Medications on File Prior to Visit   Medication Sig Dispense Refill    aspirin 81 mg chewable tablet Chew 1 tablet (81 mg) 1 time.      cholecalciferol (Vitamin D3) 50 mcg (2,000 unit) capsule Take 1 capsule (50 mcg) by mouth early in the morning..      ezetimibe (Zetia) 10 mg tablet Take 1 tablet (10 mg) by mouth early in the morning..      nitrofurantoin, macrocrystal-monohydrate, (Macrobid) 100 mg capsule Take 1 capsule (100 mg) by mouth. (Patient taking differently: Take 1 capsule (100 mg) by mouth if needed.)      pyridoxine (Vitamin B-6) 50 mg tablet Take 1 tablet (50 mg) by mouth once daily.      semaglutide, weight loss, 0.5 mg/0.5 mL pen injector Inject 0.5 mg under the skin every 7 days. (Patient not taking: Reported on 2025) 2 mL 0     No  "current facility-administered medications on file prior to visit.       Objective   /70   Pulse 76   Ht 1.473 m (4' 10\")   Wt 82.4 kg (181 lb 9.6 oz)   SpO2 95%   BMI 37.95 kg/m²       General Appearance:  Alert, cooperative, no distress,   Head:  Normocephalic, atraumatic   Eyes:  PERRL, conjunctiva/corneas clear, EOM's intact,    Lungs:   Clear to auscultation bilaterally, respirations unlabored   Heart:  Regular rate and rhythm, S1 and S2 normal, no murmur,    Extremities: Extremities normal, No edema   Neurologic: Normal         Lab Results   Component Value Date    TSH 0.95 10/16/2024       Lipid Profile:  Lab Results   Component Value Date    TRIG 89 10/16/2024    HDL 66.2 10/16/2024    LDLCALC 90 10/16/2024     Immunization History   Administered Date(s) Administered    COVID-19, mRNA, LNP-S, PF, 30 mcg/0.3 mL dose 03/19/2021, 04/09/2021, 11/30/2021    Influenza, injectable, quadrivalent 12/02/2021, 10/10/2022, 10/11/2023    MMR vaccine, subcutaneous (MMR II) 12/09/2021, 02/04/2022    Zoster vaccine, recombinant, adult (SHINGRIX) 05/07/2022, 08/24/2022       Assessment/Plan     Zaida was seen today for follow-up.  Diagnoses and all orders for this visit:  Other hyperlipidemia (Primary)  BMI 37.0-37.9, adult  -     buPROPion SR (Wellbutrin SR) 150 mg 12 hr tablet; Take 1 tablet (150 mg) by mouth 2 times a day. Do not crush, chew, or split.  Obesity due to excess calories with serious comorbidity, unspecified class  -     buPROPion SR (Wellbutrin SR) 150 mg 12 hr tablet; Take 1 tablet (150 mg) by mouth 2 times a day. Do not crush, chew, or split.  TIA (transient ischemic attack)       continue diet and exercise  Semaglutide not covered by insurance  Patient was advised to consider weight watchers for calorie counting and keeping calorie intake less than 1400 a day  Advised to continue with exercise for 1 hour 4 to 5 days a week  Will add Wellbutrin 150 twice daily for cravings and weight loss  Add " cortisol manager 1 tablet at bedtime      TIA   Intolerant/ allergic to Lipitor and pravastatin  PA for Nexletol 180 mg denied   take Zetia 10 mg daily by cardiology  Recent event monitor was normal  Echocardiogram was normal with EF of 55 to 60%  Stress test normal  Plan for loop recorder  Continue Aspirin 81 mg daily   follow-up with cardiology.    Labs From October 2024 reviewed.  Colonoscopy up-to-date 2022  Patient following Dr. Gates  Normal mammogram 12/2024  Colonoscopy- Colonoscopy done in 2022.  Patient regularly sees a gynecologist Dr. Carvalho who does her pap smear and mammogram.   Patient is due for a mammogram.   Pt had Hysterectomy  at age 43     I spent 15 minutes on obesity councelling. Pt was advised to loose weight. Discussed at length about various ways of loosing weight including healthy Diet, daily Aerobic exercise, calorie counting bariatric surgery, calorie deficit with portion control method, and medications. recommend patient maintain a diet of  1500 calories.

## 2025-02-18 ENCOUNTER — APPOINTMENT (OUTPATIENT)
Dept: PRIMARY CARE | Facility: CLINIC | Age: 60
End: 2025-02-18
Payer: COMMERCIAL

## 2025-02-25 ENCOUNTER — APPOINTMENT (OUTPATIENT)
Dept: PRIMARY CARE | Facility: CLINIC | Age: 60
End: 2025-02-25
Payer: COMMERCIAL

## 2025-02-25 VITALS
WEIGHT: 181.6 LBS | OXYGEN SATURATION: 95 % | HEIGHT: 58 IN | HEART RATE: 94 BPM | BODY MASS INDEX: 38.12 KG/M2 | DIASTOLIC BLOOD PRESSURE: 75 MMHG | SYSTOLIC BLOOD PRESSURE: 108 MMHG

## 2025-02-25 DIAGNOSIS — F51.02 ADJUSTMENT INSOMNIA: ICD-10-CM

## 2025-02-25 DIAGNOSIS — G45.9 TIA (TRANSIENT ISCHEMIC ATTACK): ICD-10-CM

## 2025-02-25 DIAGNOSIS — F41.9 ANXIETY: Primary | ICD-10-CM

## 2025-02-25 PROCEDURE — 1036F TOBACCO NON-USER: CPT | Performed by: FAMILY MEDICINE

## 2025-02-25 PROCEDURE — 3008F BODY MASS INDEX DOCD: CPT | Performed by: FAMILY MEDICINE

## 2025-02-25 PROCEDURE — 99213 OFFICE O/P EST LOW 20 MIN: CPT | Performed by: FAMILY MEDICINE

## 2025-02-25 ASSESSMENT — PATIENT HEALTH QUESTIONNAIRE - PHQ9
2. FEELING DOWN, DEPRESSED OR HOPELESS: NOT AT ALL
1. LITTLE INTEREST OR PLEASURE IN DOING THINGS: NOT AT ALL
1. LITTLE INTEREST OR PLEASURE IN DOING THINGS: NOT AT ALL
2. FEELING DOWN, DEPRESSED OR HOPELESS: NOT AT ALL
SUM OF ALL RESPONSES TO PHQ9 QUESTIONS 1 AND 2: 0
SUM OF ALL RESPONSES TO PHQ9 QUESTIONS 1 AND 2: 0

## 2025-02-25 NOTE — PROGRESS NOTES
Subjective   Patient ID: Zaida Gustafson is a 59 y.o. female who presents for Follow-up (Follow up started on wellbutrin and cortisol, not feeling as anxious)    HPI     Patient is here for follow up on weight and mood and sleep.  Pt is taking wellbutrin  BID and And cortisol manager at bedtime.   Pt is feeling better and sleeping better. Denies any side effects.no cravings.  Patient went to [place] for vacation which also help with mood.  Patient feeling much better  Going to start doing diet and exercise for losing weight.  Doing Yoga and cardio for 45 mins daily for last  1-2 months.        Previous history  Medication-h/o TIA patient taking baby aspirin 81 mg, allergic to Statin   Supplements- turmeric and ty  TIA - Patient was admited to [facility] on 10/02/2024 for  Concern for Stroke/ Transient Ischemic Attack. Patient woke up asymptomatic and began developing symptoms around 6:30 am. Presented with left sided facial droop. She also states she had left sided facial paresthesias and left upper extremity weakness. Per patient she was unable to  with her left hand.  Patient came to [facility] ER.  CT head was negative.  Patient had MRI/MRA brain which was negative for any acute infarct.  Carotid Doppler was negative.  Patient had echocardiogram which was normal.  Neurology was consulted.  Patient symptoms facial droop and left hand weakness were resolved after coming to ER.  Patient was discharged home on 10/3/2024 with aspirin and statin.  Patients father had colon cancer and multiple strokes.  .    Past Medical History:   Diagnosis Date    Allergic     Cervicalgia 04/23/2021    Chronic neck and back pain    Headache, unspecified 04/23/2021    Generalized headaches    Hypertrophy of breast 04/23/2021    Macromastia    Other specified health status     No pertinent past medical history    Stroke (Multi) 10/02/2024    Varicella       Past Surgical History:   Procedure Laterality Date    BREAST SURGERY  3/2021      SECTION, LOW TRANSVERSE  1996 &  1997    HYSTERECTOMY  2014    OTHER SURGICAL HISTORY  2021     section    OTHER SURGICAL HISTORY  2021    Hysterectomy    TUBAL LIGATION        Allergies   Allergen Reactions    Statins-Hmg-Coa Reductase Inhibitors Hives    Sulfa (Sulfonamide Antibiotics) Rash and Hives      Family History   Problem Relation Name Age of Onset    Stroke Mother Mom     Colon cancer Father Dad     Stroke Father Dad     Cancer Brother Paulino       Social History     Tobacco Use    Smoking status: Former     Current packs/day: 0.25     Average packs/day: 0.3 packs/day for 15.0 years (3.8 ttl pk-yrs)     Types: Cigarettes    Smokeless tobacco: Never   Vaping Use    Vaping status: Never Used   Substance Use Topics    Alcohol use: Yes     Alcohol/week: 3.0 standard drinks of alcohol     Types: 3 Standard drinks or equivalent per week     Comment: Social drink on weekends not every day    Drug use: Never      Current Outpatient Medications on File Prior to Visit   Medication Sig Dispense Refill    ashw/magn/Phel/banab/mar/thean (CORTISOLV ORAL) Take by mouth.      aspirin 81 mg chewable tablet Chew 1 tablet (81 mg) 1 time.      buPROPion SR (Wellbutrin SR) 150 mg 12 hr tablet Take 1 tablet (150 mg) by mouth 2 times a day. Do not crush, chew, or split. 180 tablet 0    CALCIUM ORAL Take 1 capsule by mouth once daily.      cholecalciferol (Vitamin D3) 50 mcg (2,000 unit) capsule Take 1 capsule (50 mcg) by mouth early in the morning..      ezetimibe (Zetia) 10 mg tablet Take 1 tablet (10 mg) by mouth early in the morning..      pyridoxine (Vitamin B-6) 50 mg tablet Take 1 tablet (50 mg) by mouth once daily.      nitrofurantoin, macrocrystal-monohydrate, (Macrobid) 100 mg capsule Take 1 capsule (100 mg) by mouth. (Patient not taking: Reported on 2025)       No current facility-administered medications on file prior to visit.       Objective   /75   Pulse 94   Ht  "1.473 m (4' 10\")   Wt 82.4 kg (181 lb 9.6 oz)   SpO2 95%   BMI 37.95 kg/m²       General Appearance:  Alert, cooperative, no distress,   Head:  Normocephalic, atraumatic   Eyes:  PERRL, conjunctiva/corneas clear, EOM's intact,    Lungs:   Clear to auscultation bilaterally, respirations unlabored   Heart:  Regular rate and rhythm, S1 and S2 normal, no murmur,    Extremities: Extremities normal, No edema   Neurologic: Normal         Lab Results   Component Value Date    TSH 0.95 10/16/2024       Lipid Profile:  Lab Results   Component Value Date    TRIG 89 10/16/2024    HDL 66.2 10/16/2024    LDLCALC 90 10/16/2024     Immunization History   Administered Date(s) Administered    COVID-19, mRNA, LNP-S, PF, 30 mcg/0.3 mL dose 03/19/2021, 04/09/2021, 11/30/2021    Influenza, injectable, quadrivalent 12/02/2021, 10/10/2022, 10/11/2023    MMR vaccine, subcutaneous (MMR II) 12/09/2021, 02/04/2022    Zoster vaccine, recombinant, adult (SHINGRIX) 05/07/2022, 08/24/2022       Assessment/Plan     Zaida was seen today for follow-up.  Diagnoses and all orders for this visit:  Anxiety (Primary)  BMI 37.0-37.9, adult  TIA (transient ischemic attack)  Adjustment insomnia    continue diet and exercise  consider weight watchers for calorie counting and keeping calorie intake less than 1400 a day  Advised to continue with exercise for 1 hour 4 to 5 days a week    Continue Wellbutrin 150 twice daily for cravings and weight loss  Continue cortisol manager 1 tablet at bedtime      TIA   Intolerant/ allergic to Lipitor and pravastatin  PA for Nexletol 180 mg denied   take Zetia 10 mg daily by cardiology  Recent event monitor was normal  Echocardiogram was normal with EF of 55 to 60%  Stress test normal  Plan for loop recorder  Continue Aspirin 81 mg daily   follow-up with cardiology.    Labs From October 2024 reviewed.  Colonoscopy up-to-date 2022  Patient following Dr. Gates  Normal mammogram 12/2024  Colonoscopy- Colonoscopy done " in 2022.  Patient regularly sees a gynecologist Dr. Carvalho who does her pap smear and mammogram.   Patient is due for a mammogram.   Pt had Hysterectomy  at age 43

## 2025-03-13 ENCOUNTER — OFFICE VISIT (OUTPATIENT)
Dept: PRIMARY CARE | Facility: CLINIC | Age: 60
End: 2025-03-13
Payer: COMMERCIAL

## 2025-03-13 VITALS
HEIGHT: 58 IN | SYSTOLIC BLOOD PRESSURE: 148 MMHG | DIASTOLIC BLOOD PRESSURE: 77 MMHG | HEART RATE: 104 BPM | OXYGEN SATURATION: 95 % | BODY MASS INDEX: 37.85 KG/M2 | WEIGHT: 180.3 LBS

## 2025-03-13 DIAGNOSIS — G45.9 TIA (TRANSIENT ISCHEMIC ATTACK): Primary | ICD-10-CM

## 2025-03-13 PROCEDURE — 3008F BODY MASS INDEX DOCD: CPT | Performed by: FAMILY MEDICINE

## 2025-03-13 PROCEDURE — 99214 OFFICE O/P EST MOD 30 MIN: CPT | Performed by: FAMILY MEDICINE

## 2025-03-13 PROCEDURE — 1036F TOBACCO NON-USER: CPT | Performed by: FAMILY MEDICINE

## 2025-03-13 NOTE — PROGRESS NOTES
"Subjective   Patient ID: Zaida Gustafson [id number] is a 59 y.o. female who presents for Numbness (Left face numbness).    HPI   Patient is here for left facial numbness which started 6 in the morning lasted right before 8 AM.  Patient has history of TIA in October 2024 all her workup was negative.  Patient currently denies any tingling numbness or weakness.  Patient denies any fever or chills BP slightly elevated.    Social History     Tobacco Use    Smoking status: Former     Current packs/day: 0.25     Average packs/day: 0.3 packs/day for 15.0 years (3.8 ttl pk-yrs)     Types: Cigarettes    Smokeless tobacco: Never   Vaping Use    Vaping status: Never Used   Substance Use Topics    Alcohol use: Yes     Alcohol/week: 3.0 standard drinks of alcohol     Types: 3 Standard drinks or equivalent per week     Comment: Social drink on weekends not every day    Drug use: Never       Allergies   Allergen Reactions    Statins-Hmg-Coa Reductase Inhibitors Hives    Sulfa (Sulfonamide Antibiotics) Rash and Hives       Current Outpatient Medications   Medication Sig Dispense Refill    ashw/magn/Phel/banab/mar/thean (CORTISOLV ORAL) Take by mouth.      aspirin 81 mg chewable tablet Chew 1 tablet (81 mg) 1 time.      buPROPion SR (Wellbutrin SR) 150 mg 12 hr tablet Take 1 tablet (150 mg) by mouth 2 times a day. Do not crush, chew, or split. 180 tablet 0    CALCIUM ORAL Take 1 capsule by mouth once daily.      cholecalciferol (Vitamin D3) 50 mcg (2,000 unit) capsule Take 1 capsule (50 mcg) by mouth early in the morning..      ezetimibe (Zetia) 10 mg tablet Take 1 tablet (10 mg) by mouth early in the morning..      nitrofurantoin, macrocrystal-monohydrate, (Macrobid) 100 mg capsule Take 1 capsule (100 mg) by mouth.      pyridoxine (Vitamin B-6) 50 mg tablet Take 1 tablet (50 mg) by mouth once daily.       No current facility-administered medications for this visit.       Physical Exam  /77   Pulse 104   Ht 1.473 m (4' 10\")   " Wt 81.8 kg (180 lb 4.8 oz)   SpO2 95%   BMI 37.68 kg/m²     General Appearance:  Alert, cooperative, no distress,   Head:  Normocephalic, atraumatic   Eyes:  PERRL, conjunctiva/corneas clear, EOM's intact,    Lungs:   Clear to auscultation bilaterally, respirations unlabored   Heart:  Regular rate and rhythm, S1 and S2 normal, no murmur,    Neurologic: Normal      Lab on 01/15/2025   Component Date Value Ref Range Status    Hemoglobin A1C 01/15/2025 5.0  See comment % Final    Estimated Average Glucose 01/15/2025 97  Not Established mg/dL Final       Assessment/Plan   Diagnoses and all orders for this visit:  TIA (transient ischemic attack)  Patient made aware of concern for possible TIA again.  BP elevated in office.  Patient was advised to go to ER for observation for TIA and neuro eval

## 2025-03-17 ENCOUNTER — TELEPHONE (OUTPATIENT)
Dept: PRIMARY CARE | Facility: CLINIC | Age: 60
End: 2025-03-17
Payer: COMMERCIAL

## 2025-03-24 ENCOUNTER — TELEPHONE (OUTPATIENT)
Dept: PRIMARY CARE | Facility: CLINIC | Age: 60
End: 2025-03-24
Payer: COMMERCIAL

## 2025-03-24 NOTE — TELEPHONE ENCOUNTER
Patient seen in the hospital last week. Stated she had called to see if Dr. Glover wanted to see her, stated she went in for stroke symptoms. Discharge papers told her to follow up in 5-7 days but no openings. Please advise.

## 2025-03-26 ENCOUNTER — OFFICE VISIT (OUTPATIENT)
Dept: PRIMARY CARE | Facility: CLINIC | Age: 60
End: 2025-03-26
Payer: COMMERCIAL

## 2025-03-26 VITALS
SYSTOLIC BLOOD PRESSURE: 115 MMHG | HEIGHT: 58 IN | HEART RATE: 95 BPM | BODY MASS INDEX: 37.74 KG/M2 | OXYGEN SATURATION: 98 % | DIASTOLIC BLOOD PRESSURE: 64 MMHG | WEIGHT: 179.8 LBS

## 2025-03-26 DIAGNOSIS — G45.9 TIA (TRANSIENT ISCHEMIC ATTACK): Primary | ICD-10-CM

## 2025-03-26 DIAGNOSIS — F41.9 ANXIETY: ICD-10-CM

## 2025-03-26 PROCEDURE — 1036F TOBACCO NON-USER: CPT | Performed by: FAMILY MEDICINE

## 2025-03-26 PROCEDURE — 99495 TRANSJ CARE MGMT MOD F2F 14D: CPT | Performed by: FAMILY MEDICINE

## 2025-03-26 PROCEDURE — 3008F BODY MASS INDEX DOCD: CPT | Performed by: FAMILY MEDICINE

## 2025-03-26 NOTE — PROGRESS NOTES
Hospital follow up    Patient ID: Zaida Gustafson is a 59 y.o. female [id number] who presents for Hospital Discharge follow up.     Admitted to [facility]  Admission date  3/13/2025  Discharge date   3/14/2025  Admit Diagnosis - Pt with h/o HLD and previous TIA  Anxiety, was sent to Emergency room with concern left lip and left cheeck numbness on 3/13/2025,  Patient started noticing numbness in the morning on the left face.  Lasted for few hour got better by itself.  Patient was sent to [facility] ER for concern for possible TIA.  Patient had lab work and UA which was negative.  CT head was negative.  Neurology were consulted.  MRI brain with no significant abnormality.  Patient was discharged home under stable condition with neurology and EP cardiology follow-up.    Pt had post discharge follow up phone call.   Pt is taking  baby aspirin 81 mg  and Zetia 10 mg daily. Pt is tolerating well   No new Numbness or weakness.     Pt going to see Dr. Cherry cardiology , going to get Loop recorder 4/4/2025.    Has appt with neurology Dr. Good in 3 months.     Patient noticed a rash with Wellbutrin twice daily.  Now it is better.  Denies any chest pain or shortness of breath.  Would like to restart Wellbutrin once a day for mood and weight.  Patient is willing to reconsider medication.  Patient was advised to call office let me know if there is any side effect.  Patient understand and agree with the plan.      Social History     Socioeconomic History    Marital status:    Tobacco Use    Smoking status: Former     Current packs/day: 0.25     Average packs/day: 0.3 packs/day for 15.0 years (3.8 ttl pk-yrs)     Types: Cigarettes    Smokeless tobacco: Never   Vaping Use    Vaping status: Never Used   Substance and Sexual Activity    Alcohol use: Yes     Alcohol/week: 3.0 standard drinks of alcohol     Types: 3 Standard drinks or equivalent per week     Comment: Social drink on weekends not every day    Drug use: Never  "   Sexual activity: Yes     Partners: Male     Physical Exam  /64   Pulse 95   Ht 1.473 m (4' 10\")   Wt 81.6 kg (179 lb 12.8 oz)   SpO2 98%   BMI 37.58 kg/m²     No visits with results within 3 Week(s) from this visit.   Latest known visit with results is:   Lab on 01/15/2025   Component Date Value Ref Range Status    Hemoglobin A1C 01/15/2025 5.0  See comment % Final    Estimated Average Glucose 01/15/2025 97  Not Established mg/dL Final       Current Outpatient Medications   Medication Sig Dispense Refill    ashw/magn/Phel/banab/mar/thean (CORTISOLV ORAL) Take by mouth.      aspirin 81 mg chewable tablet Chew 1 tablet (81 mg) 1 time.      buPROPion SR (Wellbutrin SR) 150 mg 12 hr tablet Take 1 tablet (150 mg) by mouth 2 times a day. Do not crush, chew, or split. 180 tablet 0    CALCIUM ORAL Take 1 capsule by mouth once daily.      cholecalciferol (Vitamin D3) 50 mcg (2,000 unit) capsule Take 1 capsule (50 mcg) by mouth early in the morning..      ezetimibe (Zetia) 10 mg tablet Take 1 tablet (10 mg) by mouth early in the morning..      nitrofurantoin, macrocrystal-monohydrate, (Macrobid) 100 mg capsule Take 1 capsule (100 mg) by mouth.      pyridoxine (Vitamin B-6) 50 mg tablet Take 1 tablet (50 mg) by mouth once daily.       No current facility-administered medications for this visit.     General Appearance:  Alert, cooperative, no distress,   Head:  Normocephalic, atraumatic   Eyes:  PERRL, conjunctiva/corneas clear, EOM's intact,    Lungs:   Clear to auscultation bilaterally, respirations unlabored   Heart:  Regular rate and rhythm, S1 and S2 normal, no murmur,    Abdomen:   Soft, non-tender, bowel sounds active all four quadrants,  no masses, no organomegaly   Extremities: Extremities normal, No edema   Neurologic: Normal        Assessment/Plan   Diagnoses and all orders for this visit:  TIA (transient ischemic attack)  Anxiety  BMI 37.0-37.9, adult      Continue baby aspirin and Zetia 10 mg " daily  Follow-up EP cardiology Dr. Napoles April 4.  Plan for loop recorder April 4  Follow-up neurology Dr. Good in 3-month    Anxiety patient going to restart taking Wellbutrin once a day  Follow-up 1 month      patient was recently seen in the hospital and patient's hospital record has been reviewed with the patient including, but not limited to, discharge summary, labs, imaging, consultation notes (if pertinent). Ambulatory medication list and discharge hospitalization list has been compared and updated for changes.

## 2025-04-16 ENCOUNTER — APPOINTMENT (OUTPATIENT)
Dept: PRIMARY CARE | Facility: CLINIC | Age: 60
End: 2025-04-16
Payer: COMMERCIAL

## 2025-04-16 VITALS
WEIGHT: 182 LBS | HEART RATE: 80 BPM | HEIGHT: 58 IN | SYSTOLIC BLOOD PRESSURE: 116 MMHG | BODY MASS INDEX: 38.2 KG/M2 | OXYGEN SATURATION: 97 % | DIASTOLIC BLOOD PRESSURE: 57 MMHG

## 2025-04-16 DIAGNOSIS — L50.9 HIVES: ICD-10-CM

## 2025-04-16 DIAGNOSIS — E66.1 CLASS 2 DRUG-INDUCED OBESITY WITHOUT SERIOUS COMORBIDITY WITH BODY MASS INDEX (BMI) OF 38.0 TO 38.9 IN ADULT: Primary | ICD-10-CM

## 2025-04-16 DIAGNOSIS — E66.812 CLASS 2 DRUG-INDUCED OBESITY WITHOUT SERIOUS COMORBIDITY WITH BODY MASS INDEX (BMI) OF 38.0 TO 38.9 IN ADULT: Primary | ICD-10-CM

## 2025-04-16 DIAGNOSIS — G45.9 TIA (TRANSIENT ISCHEMIC ATTACK): ICD-10-CM

## 2025-04-16 DIAGNOSIS — E78.49 OTHER HYPERLIPIDEMIA: ICD-10-CM

## 2025-04-16 PROCEDURE — 1036F TOBACCO NON-USER: CPT | Performed by: FAMILY MEDICINE

## 2025-04-16 PROCEDURE — 99214 OFFICE O/P EST MOD 30 MIN: CPT | Performed by: FAMILY MEDICINE

## 2025-04-16 PROCEDURE — 3008F BODY MASS INDEX DOCD: CPT | Performed by: FAMILY MEDICINE

## 2025-04-16 ASSESSMENT — PATIENT HEALTH QUESTIONNAIRE - PHQ9
2. FEELING DOWN, DEPRESSED OR HOPELESS: NOT AT ALL
1. LITTLE INTEREST OR PLEASURE IN DOING THINGS: NOT AT ALL
SUM OF ALL RESPONSES TO PHQ9 QUESTIONS 1 AND 2: 0

## 2025-04-16 NOTE — PROGRESS NOTES
"Chief Complaint   Patient presents with    Follow-up     Follow up wants to discuss ozempic, loop recorder placed on 4/4/25         Patient ID: Zaida Gustafson is a 59 y.o. female [id number]  here for follow up.      Pt with h/o HLD and previous TIA  Anxiety, was sent to Emergency room with concern left lip and left cheeck numbness on 3/13/2025 for TIA,   Patient had lab work and UA which was negative.  CT head was negative.  Neurology were consulted.  MRI brain with no significant abnormality.  Patient was discharged  with neurology and EP cardiology follow-up.    Pt is taking  baby aspirin 81 mg  and Zetia 10 mg daily. Pt is tolerating well   No new Numbness or weakness.     Pt was seen by  Dr. Cherry cardiology , Pt had  Loop recorder placed for 2 weeks. Reading were normal per pt.     Has appt with neurology Dr. Good in June 2025.      Patient started on  Wellbutrin twice daily for weight and mood.  Started noticing Rash again with wellbutrin.  Started walking and Started Clean eats for weight loss for  last 1 month.   Want to try Ozempic for weight loss. Pt has coupon for Ozempic  for 48 months.    Social History     Socioeconomic History    Marital status:    Tobacco Use    Smoking status: Former     Current packs/day: 0.25     Average packs/day: 0.3 packs/day for 15.0 years (3.8 ttl pk-yrs)     Types: Cigarettes    Smokeless tobacco: Never   Vaping Use    Vaping status: Never Used   Substance and Sexual Activity    Alcohol use: Yes     Alcohol/week: 3.0 standard drinks of alcohol     Types: 3 Standard drinks or equivalent per week     Comment: Social drink on weekends not every day    Drug use: Never    Sexual activity: Yes     Partners: Male     Physical Exam  /57   Pulse 80   Ht 1.473 m (4' 10\")   Wt 82.6 kg (182 lb)   SpO2 97%   BMI 38.04 kg/m²     No visits with results within 3 Week(s) from this visit.   Latest known visit with results is:   Lab on 01/15/2025   Component Date Value Ref " Range Status    Hemoglobin A1C 01/15/2025 5.0  See comment % Final    Estimated Average Glucose 01/15/2025 97  Not Established mg/dL Final       Current Outpatient Medications   Medication Sig Dispense Refill    ashw/magn/Phel/banab/mar/thean (CORTISOLV ORAL) Take by mouth.      aspirin 81 mg chewable tablet Chew 1 tablet (81 mg) 1 time.      CALCIUM ORAL Take 1 capsule by mouth once daily.      cholecalciferol (Vitamin D3) 50 mcg (2,000 unit) capsule Take 1 capsule (2,000 Units) by mouth early in the morning..      ezetimibe (Zetia) 10 mg tablet Take 1 tablet (10 mg) by mouth early in the morning..      nitrofurantoin, macrocrystal-monohydrate, (Macrobid) 100 mg capsule Take 1 capsule (100 mg) by mouth.      pyridoxine (Vitamin B-6) 50 mg tablet Take 1 tablet (50 mg) by mouth once daily.      buPROPion SR (Wellbutrin SR) 150 mg 12 hr tablet Take 1 tablet (150 mg) by mouth 2 times a day. Do not crush, chew, or split. 180 tablet 0     No current facility-administered medications for this visit.     General Appearance:  Alert, cooperative, no distress,   Head:  Normocephalic, atraumatic   Eyes:  PERRL, conjunctiva/corneas clear, EOM's intact,    Lungs:   Clear to auscultation bilaterally, respirations unlabored   Heart:  Regular rate and rhythm, S1 and S2 normal, no murmur,        Extremities: Extremities normal, No edema   Neurologic: Normal        Assessment/Plan   Zaida was seen today for follow-up.  Diagnoses and all orders for this visit:  Class 2 drug-induced obesity without serious comorbidity with body mass index (BMI) of 38.0 to 38.9 in adult (Primary)  -     semaglutide 0.25 mg or 0.5 mg (2 mg/3 mL) pen injector; Inject 0.25 mg under the skin 1 (one) time per week. For 2 weeks f/by 0.5 mg once a week  TIA (transient ischemic attack)  Other hyperlipidemia  Hives      Continue baby aspirin and Zetia 10 mg daily  Follow-up EP cardiology Dr. Napoles April 4.  S/p loop recorder - with normal finding  Follow-up  neurology Dr. Good 6/2025    Off Wellbutrin due to rash   Follow-up 1 month    BMI 38 with hyperlipidemia and recent TIA  Will start Ozempic 0.25 mg once a week for 2 weeks followed by 0.5 mg once a week.  Patient notified of side effect.  Patient has discount coupon.      F/up  in 1 month

## 2025-04-21 DIAGNOSIS — E66.1 CLASS 2 DRUG-INDUCED OBESITY WITHOUT SERIOUS COMORBIDITY WITH BODY MASS INDEX (BMI) OF 38.0 TO 38.9 IN ADULT: ICD-10-CM

## 2025-04-21 DIAGNOSIS — E66.812 CLASS 2 DRUG-INDUCED OBESITY WITHOUT SERIOUS COMORBIDITY WITH BODY MASS INDEX (BMI) OF 38.0 TO 38.9 IN ADULT: ICD-10-CM

## 2025-04-25 ENCOUNTER — APPOINTMENT (OUTPATIENT)
Dept: PRIMARY CARE | Facility: CLINIC | Age: 60
End: 2025-04-25
Payer: COMMERCIAL

## 2025-05-15 ENCOUNTER — APPOINTMENT (OUTPATIENT)
Dept: PRIMARY CARE | Facility: CLINIC | Age: 60
End: 2025-05-15
Payer: COMMERCIAL

## 2025-05-15 VITALS
SYSTOLIC BLOOD PRESSURE: 110 MMHG | BODY MASS INDEX: 38.83 KG/M2 | HEART RATE: 79 BPM | HEIGHT: 58 IN | DIASTOLIC BLOOD PRESSURE: 71 MMHG | OXYGEN SATURATION: 97 % | WEIGHT: 185 LBS

## 2025-05-15 DIAGNOSIS — E66.1 CLASS 2 DRUG-INDUCED OBESITY WITHOUT SERIOUS COMORBIDITY WITH BODY MASS INDEX (BMI) OF 38.0 TO 38.9 IN ADULT: Primary | ICD-10-CM

## 2025-05-15 DIAGNOSIS — E66.812 CLASS 2 DRUG-INDUCED OBESITY WITHOUT SERIOUS COMORBIDITY WITH BODY MASS INDEX (BMI) OF 38.0 TO 38.9 IN ADULT: Primary | ICD-10-CM

## 2025-05-15 PROCEDURE — 3008F BODY MASS INDEX DOCD: CPT | Performed by: FAMILY MEDICINE

## 2025-05-15 PROCEDURE — 99214 OFFICE O/P EST MOD 30 MIN: CPT | Performed by: FAMILY MEDICINE

## 2025-05-15 PROCEDURE — 1036F TOBACCO NON-USER: CPT | Performed by: FAMILY MEDICINE

## 2025-05-15 NOTE — PROGRESS NOTES
"Chief Complaint   Patient presents with    Follow-up     Wt check         Patient ID: Zaida Gustafson is a 59 y.o. female [id number]  here for follow up.      Pt with h/o HLD and previous TIA  Anxiety, was sent to Emergency room with concern left lip and left cheeck numbness on 3/13/2025 for TIA,  Patient had lab work and UA which was negative.  CT head was negative.  Neurology were consulted.  MRI brain with no significant abnormality.  Patient was discharged  with neurology and EP cardiology follow-up.    Pt is taking  baby aspirin 81 mg  and Zetia 10 mg daily. Pt is tolerating well   No new Numbness or weakness.     Pt was seen by  Dr. Cherry cardiology , Pt had  Loop recorder placed for 2 weeks. Reading were normal per pt.     Has appt with neurology Dr. Good in June 2025.      Patient started on  Wellbutrin twice daily for weight and mood.  Started noticing Rash again with wellbutrin.  Started walking and Started Clean eats for weight loss for  last 1 month.   Insurance declined covering for Ozempic or semaglutide patient went to compounded pharmacy in [place].  Would like to start semaglutide at low dose from compounded pharmacy.  Patient brought the prescription.    Social History     Socioeconomic History    Marital status:    Tobacco Use    Smoking status: Former     Current packs/day: 0.25     Average packs/day: 0.3 packs/day for 15.0 years (3.8 ttl pk-yrs)     Types: Cigarettes    Smokeless tobacco: Never   Vaping Use    Vaping status: Never Used   Substance and Sexual Activity    Alcohol use: Yes     Alcohol/week: 3.0 standard drinks of alcohol     Types: 3 Standard drinks or equivalent per week     Comment: Social drink on weekends not every day    Drug use: Never    Sexual activity: Yes     Partners: Male     Physical Exam  /71   Pulse 79   Ht 1.473 m (4' 10\")   Wt 83.9 kg (185 lb)   SpO2 97%   BMI 38.67 kg/m²     No visits with results within 3 Week(s) from this visit.   Latest known " visit with results is:   Lab on 01/15/2025   Component Date Value Ref Range Status    Hemoglobin A1C 01/15/2025 5.0  See comment % Final    Estimated Average Glucose 01/15/2025 97  Not Established mg/dL Final       Current Outpatient Medications   Medication Sig Dispense Refill    ashw/magn/Phel/banab/mar/thean (CORTISOLV ORAL) Take by mouth.      aspirin 81 mg chewable tablet Chew 1 tablet (81 mg) 1 time.      CALCIUM ORAL Take 1 capsule by mouth once daily.      cholecalciferol (Vitamin D3) 50 mcg (2,000 unit) capsule Take 1 capsule (2,000 Units) by mouth early in the morning..      ezetimibe (Zetia) 10 mg tablet Take 1 tablet (10 mg) by mouth early in the morning..      nitrofurantoin, macrocrystal-monohydrate, (Macrobid) 100 mg capsule Take 1 capsule (100 mg) by mouth.      pyridoxine (Vitamin B-6) 50 mg tablet Take 1 tablet (50 mg) by mouth once daily.      semaglutide 0.25 mg or 0.5 mg (2 mg/3 mL) pen injector Inject 0.25 mg under the skin 1 (one) time per week. For 2 weeks f/by 0.5 mg once a week 9 mL 0    buPROPion SR (Wellbutrin SR) 150 mg 12 hr tablet Take 1 tablet (150 mg) by mouth 2 times a day. Do not crush, chew, or split. 180 tablet 0     No current facility-administered medications for this visit.     General Appearance:  Alert, cooperative, no distress,   Head:  Normocephalic, atraumatic   Eyes:  PERRL, conjunctiva/corneas clear, EOM's intact,    Lungs:   Clear to auscultation bilaterally, respirations unlabored   Heart:  Regular rate and rhythm, S1 and S2 normal, no murmur,        Extremities: Extremities normal, No edema   Neurologic: Normal        Assessment/Plan   There are no diagnoses linked to this encounter.  Zaida was seen today for follow-up.  Diagnoses and all orders for this visit:  Class 2 drug-induced obesity without serious comorbidity with body mass index (BMI) of 38.0 to 38.9 in adult (Primary)  Adult BMI 38.0-38.9 kg/sq m    Continue baby aspirin and Zetia 10 mg daily  Follow-up  EP cardiology Dr. Napoles April 4.  S/p loop recorder - with normal finding  Follow-up neurology Dr. Good 6/2025    Off Wellbutrin due to rash   Follow-up 1 month    BMI 38 with hyperlipidemia and recent TIA  Ozempic not covered by insurance  Continue diet and exercise  Patient is going to start semaglutide from compounded pharmacy  rx for 0.25 mg once a week semaglutide given to the patient.   Side effect of semaglutide discussed with the patient.    F/up  in 1 month

## 2025-06-12 ENCOUNTER — APPOINTMENT (OUTPATIENT)
Dept: PRIMARY CARE | Facility: CLINIC | Age: 60
End: 2025-06-12
Payer: COMMERCIAL

## 2025-06-12 VITALS
HEIGHT: 58 IN | OXYGEN SATURATION: 99 % | DIASTOLIC BLOOD PRESSURE: 64 MMHG | BODY MASS INDEX: 38.2 KG/M2 | SYSTOLIC BLOOD PRESSURE: 121 MMHG | HEART RATE: 74 BPM | WEIGHT: 182 LBS

## 2025-06-12 DIAGNOSIS — E66.1 CLASS 2 DRUG-INDUCED OBESITY WITHOUT SERIOUS COMORBIDITY WITH BODY MASS INDEX (BMI) OF 38.0 TO 38.9 IN ADULT: ICD-10-CM

## 2025-06-12 DIAGNOSIS — E78.49 OTHER HYPERLIPIDEMIA: ICD-10-CM

## 2025-06-12 DIAGNOSIS — G45.9 TIA (TRANSIENT ISCHEMIC ATTACK): ICD-10-CM

## 2025-06-12 DIAGNOSIS — E66.812 CLASS 2 DRUG-INDUCED OBESITY WITHOUT SERIOUS COMORBIDITY WITH BODY MASS INDEX (BMI) OF 38.0 TO 38.9 IN ADULT: ICD-10-CM

## 2025-06-12 PROCEDURE — 1036F TOBACCO NON-USER: CPT | Performed by: FAMILY MEDICINE

## 2025-06-12 PROCEDURE — 99213 OFFICE O/P EST LOW 20 MIN: CPT | Performed by: FAMILY MEDICINE

## 2025-06-12 PROCEDURE — G0447 BEHAVIOR COUNSEL OBESITY 15M: HCPCS | Performed by: FAMILY MEDICINE

## 2025-06-12 PROCEDURE — 3008F BODY MASS INDEX DOCD: CPT | Performed by: FAMILY MEDICINE

## 2025-06-12 ASSESSMENT — PATIENT HEALTH QUESTIONNAIRE - PHQ9
SUM OF ALL RESPONSES TO PHQ9 QUESTIONS 1 AND 2: 0
1. LITTLE INTEREST OR PLEASURE IN DOING THINGS: NOT AT ALL
2. FEELING DOWN, DEPRESSED OR HOPELESS: NOT AT ALL

## 2025-06-12 NOTE — PROGRESS NOTES
"Chief Complaint   Patient presents with    Follow-up     Follow up wt check         Patient ID: Zaida Gustafson is a 59 y.o. female [id number]  here for follow up for weight check.      Pt with h/o HLD and previous TIA  Anxiety,  morbid Obesity  here for follow up.       Pt is taking  baby aspirin 81 mg  and Zetia 10 mg daily. Pt is tolerating well   No new Numbness or weakness.     Pt was seen by  Dr. Cherry cardiology , Pt had  Loop recorder placed for 2 weeks. Reading were normal per pt. Has appt next week    Has appt with neurology Dr. Good in July 1 st 2025    Patient started on  Wellbutrin twice daily for weight and mood.  Started noticing Rash again with wellbutrin.    Patient started on semaglutide 0.25mg injector 4/21/25. Reported mild nausea when starting medication. Denies vomiting, abdominal pain, constipation.     Started walking 2-3 miles a day and Morning exercises and Started Clean eats for weight loss for  last 1 month. Lost 4 lbs  in last 1 months  Insurance declined covering for Ozempic or semaglutide patient went to compounded pharmacy in [place].      Social History     Socioeconomic History    Marital status:    Tobacco Use    Smoking status: Former     Current packs/day: 0.25     Average packs/day: 0.3 packs/day for 15.0 years (3.8 ttl pk-yrs)     Types: Cigarettes    Smokeless tobacco: Never   Vaping Use    Vaping status: Never Used   Substance and Sexual Activity    Alcohol use: Yes     Alcohol/week: 3.0 standard drinks of alcohol     Types: 3 Standard drinks or equivalent per week     Comment: Social drink on weekends not every day    Drug use: Never    Sexual activity: Yes     Partners: Male     Physical Exam  /64   Pulse 74   Ht 1.473 m (4' 10\")   Wt 82.6 kg (182 lb)   SpO2 99%   BMI 38.04 kg/m²     No visits with results within 3 Week(s) from this visit.   Latest known visit with results is:   Lab on 01/15/2025   Component Date Value Ref Range Status    Hemoglobin A1C " 01/15/2025 5.0  See comment % Final    Estimated Average Glucose 01/15/2025 97  Not Established mg/dL Final       Current Outpatient Medications   Medication Sig Dispense Refill    ashw/magn/Phel/banab/mar/thean (CORTISOLV ORAL) Take by mouth.      aspirin 81 mg chewable tablet Chew 1 tablet (81 mg) 1 time.      CALCIUM ORAL Take 1 capsule by mouth once daily.      cholecalciferol (Vitamin D3) 50 mcg (2,000 unit) capsule Take 1 capsule (2,000 Units) by mouth early in the morning..      ezetimibe (Zetia) 10 mg tablet Take 1 tablet (10 mg) by mouth early in the morning..      nitrofurantoin, macrocrystal-monohydrate, (Macrobid) 100 mg capsule Take 1 capsule (100 mg) by mouth.      pyridoxine (Vitamin B-6) 50 mg tablet Take 1 tablet (50 mg) by mouth once daily.      semaglutide 0.25 mg or 0.5 mg (2 mg/3 mL) pen injector Inject 0.25 mg under the skin 1 (one) time per week. For 2 weeks f/by 0.5 mg once a week 9 mL 0    buPROPion SR (Wellbutrin SR) 150 mg 12 hr tablet Take 1 tablet (150 mg) by mouth 2 times a day. Do not crush, chew, or split. 180 tablet 0     No current facility-administered medications for this visit.     General Appearance:  Alert, cooperative, no distress,   Head:  Normocephalic, atraumatic   Eyes:  PERRL, conjunctiva/corneas clear, EOM's intact,    Lungs:   Clear to auscultation bilaterally, respirations unlabored   Heart:  Regular rate and rhythm, S1 and S2 normal, no murmur,        Extremities: Extremities normal, No edema   Neurologic: Normal        Assessment/Plan   Zaida was seen today for follow-up.  Diagnoses and all orders for this visit:  Adult BMI 38.0-38.9 kg/sq m (Primary)  -     semaglutide 0.25 mg or 0.5 mg (2 mg/3 mL) pen injector; Inject 0.5 mg under the skin 1 (one) time per week.  Class 2 drug-induced obesity without serious comorbidity with body mass index (BMI) of 38.0 to 38.9 in adult  -     semaglutide 0.25 mg or 0.5 mg (2 mg/3 mL) pen injector; Inject 0.5 mg under the skin 1  (one) time per week.  TIA (transient ischemic attack)  Other hyperlipidemia        Continue baby aspirin and Zetia 10 mg daily  Follow-up EP cardiology Dr. Napoles April 4.  S/p loop recorder - with normal finding  Follow-up neurology Dr. Good 6/2025    Off Wellbutrin due to rash   Follow-up 1 month    BMI 38 with hyperlipidemia and recent TIA  Ozempic not covered by insurance  Continue diet and exercise  Continue semaglutide from compounded pharmacy  Increase 0.5mg  once a week semaglutide given to the patient.       I spent 15 minutes on obesity councelling. Pt was advised to loose weight. Discussed at length about various ways of loosing weight including healthy Diet, daily Aerobic exercise, calorie counting bariatric surgery, calorie deficit with portion control method, and medications. recommend patient maintain a diet of  1500 jennifer    F/up  in 1 month

## 2025-06-16 ENCOUNTER — TELEPHONE (OUTPATIENT)
Dept: PRIMARY CARE | Facility: CLINIC | Age: 60
End: 2025-06-16

## 2025-06-16 NOTE — TELEPHONE ENCOUNTER
RX NEEDS TO GO TO [facility]. NEEDS TO BE FAXED. STATES LEFT VM ON FRIDAY AND TODAY WITH FAX NUMBER ALSO

## 2025-06-26 ENCOUNTER — APPOINTMENT (OUTPATIENT)
Dept: PRIMARY CARE | Facility: CLINIC | Age: 60
End: 2025-06-26
Payer: COMMERCIAL

## 2025-07-14 ENCOUNTER — APPOINTMENT (OUTPATIENT)
Dept: PRIMARY CARE | Facility: CLINIC | Age: 60
End: 2025-07-14
Payer: COMMERCIAL

## 2025-07-14 VITALS
DIASTOLIC BLOOD PRESSURE: 79 MMHG | HEART RATE: 92 BPM | BODY MASS INDEX: 37.99 KG/M2 | WEIGHT: 181 LBS | OXYGEN SATURATION: 98 % | HEIGHT: 58 IN | SYSTOLIC BLOOD PRESSURE: 115 MMHG

## 2025-07-14 DIAGNOSIS — E66.812 CLASS 2 DRUG-INDUCED OBESITY WITHOUT SERIOUS COMORBIDITY WITH BODY MASS INDEX (BMI) OF 38.0 TO 38.9 IN ADULT: ICD-10-CM

## 2025-07-14 DIAGNOSIS — E66.1 CLASS 2 DRUG-INDUCED OBESITY WITHOUT SERIOUS COMORBIDITY WITH BODY MASS INDEX (BMI) OF 38.0 TO 38.9 IN ADULT: ICD-10-CM

## 2025-07-14 PROCEDURE — G0447 BEHAVIOR COUNSEL OBESITY 15M: HCPCS | Performed by: FAMILY MEDICINE

## 2025-07-14 PROCEDURE — 99213 OFFICE O/P EST LOW 20 MIN: CPT | Performed by: FAMILY MEDICINE

## 2025-07-14 PROCEDURE — 3008F BODY MASS INDEX DOCD: CPT | Performed by: FAMILY MEDICINE

## 2025-07-14 PROCEDURE — 1036F TOBACCO NON-USER: CPT | Performed by: FAMILY MEDICINE

## 2025-07-14 NOTE — PROGRESS NOTES
"Chief Complaint   Patient presents with    Follow-up     Follow up         Patient ID: Zaida Gustafson is a 59 y.o. female [id number]  here for follow up for weight check.      Pt with h/o HLD and previous TIA  Anxiety,  morbid Obesity  here for follow up.   Patient was on vacation and just came back.  Was not following diet lost 1 pound in last 1 month 4 pounds total since starting semaglutide    Pt is taking  baby aspirin 81 mg  and Zetia 10 mg daily. Pt is tolerating well   No new Numbness or weakness.     Pt was seen by  Dr. Cherry cardiology , Pt had  Loop recorder placed for 2 weeks. Reading were normal per pt. Has appt next week    Has appt with neurology Dr. Good in July 1 st 2025    Patient started on  Wellbutrin twice daily for weight and mood.  Started noticing Rash again with wellbutrin.    Patient taking semaglutide 0.25mg injector  since 4/21/25. Reported mild nausea and belching otherwise no side effect denies vomiting, abdominal pain, constipation.  Patient was advised to consider motility pro    Started walking 2-3 miles a day and Morning exercises and Started Clean eats for weight loss for  last 1 month. Lost 4 lbs  in last 1 months  Insurance declined covering for Ozempic or semaglutide.  Getting compounded semaglutide from compounded pharmacy    Social History     Socioeconomic History    Marital status:    Tobacco Use    Smoking status: Former     Current packs/day: 0.25     Average packs/day: 0.3 packs/day for 15.0 years (3.8 ttl pk-yrs)     Types: Cigarettes    Smokeless tobacco: Never   Vaping Use    Vaping status: Never Used   Substance and Sexual Activity    Alcohol use: Yes     Alcohol/week: 3.0 standard drinks of alcohol     Types: 3 Standard drinks or equivalent per week     Comment: Social drink on weekends not every day    Drug use: Never    Sexual activity: Yes     Partners: Male     Physical Exam  /79   Pulse 92   Ht (!) 1.473 m (4' 10\")   Wt 82.1 kg (181 lb)   SpO2 " 98%   BMI 37.83 kg/m²     No visits with results within 3 Week(s) from this visit.   Latest known visit with results is:   Lab on 01/15/2025   Component Date Value Ref Range Status    Hemoglobin A1C 01/15/2025 5.0  See comment % Final    Estimated Average Glucose 01/15/2025 97  Not Established mg/dL Final       Current Outpatient Medications   Medication Sig Dispense Refill    ashw/magn/Phel/banab/mar/thean (CORTISOLV ORAL) Take by mouth.      aspirin 81 mg chewable tablet Chew and swallow 1 tablet (81 mg) 1 time.      CALCIUM ORAL Take 1 capsule by mouth once daily.      cholecalciferol (Vitamin D3) 50 mcg (2,000 unit) capsule Take 1 capsule (2,000 Units) by mouth early in the morning..      ezetimibe (Zetia) 10 mg tablet Take 1 tablet (10 mg) by mouth early in the morning..      nitrofurantoin, macrocrystal-monohydrate, (Macrobid) 100 mg capsule Take 1 capsule (100 mg) by mouth.      pyridoxine (Vitamin B-6) 50 mg tablet Take 1 tablet (50 mg) by mouth once daily.      semaglutide 0.25 mg or 0.5 mg (2 mg/3 mL) pen injector Inject 0.5 mg under the skin 1 (one) time per week. 3 mL 1    buPROPion SR (Wellbutrin SR) 150 mg 12 hr tablet Take 1 tablet (150 mg) by mouth 2 times a day. Do not crush, chew, or split. 180 tablet 0    semaglutide 0.25 mg or 0.5 mg (2 mg/3 mL) pen injector Inject 0.25 mg under the skin 1 (one) time per week. For 2 weeks f/by 0.5 mg once a week (Patient not taking: Reported on 7/14/2025) 9 mL 0     No current facility-administered medications for this visit.     General Appearance:  Alert, cooperative, no distress,   Head:  Normocephalic, atraumatic   Eyes:  PERRL, conjunctiva/corneas clear, EOM's intact,    Lungs:   Clear to auscultation bilaterally, respirations unlabored   Heart:  Regular rate and rhythm, S1 and S2 normal, no murmur,        Extremities: Extremities normal, No edema   Neurologic: Normal        Assessment/Plan   Zaida was seen today for follow-up.  Diagnoses and all orders  for this visit:  BMI 37.0-37.9, adult (Primary)  -     semaglutide 0.25 mg or 0.5 mg (2 mg/3 mL) pen injector; Inject 0.5 mg under the skin 1 (one) time per week.  Class 2 drug-induced obesity without serious comorbidity with body mass index (BMI) of 38.0 to 38.9 in adult  -     semaglutide 0.25 mg or 0.5 mg (2 mg/3 mL) pen injector; Inject 0.5 mg under the skin 1 (one) time per week.    Continue baby aspirin and Zetia 10 mg daily  Follow-up EP cardiology Dr. Napoles April 4.  S/p loop recorder - with normal finding  Follow-up neurology Dr. Good 6/2025    Off Wellbutrin due to rash     BMI 38 with hyperlipidemia and recent TIA  Ozempic not covered by insurance  Continue diet and exercise  Continue semaglutide from compounded pharmacy  Increase 0.5mg  once a week semaglutide given to the patient.       I spent 15 minutes on obesity councelling. Pt was advised to loose weight. Discussed at length about various ways of loosing weight including healthy Diet, daily Aerobic exercise, calorie counting bariatric surgery, calorie deficit with portion control method, and medications. recommend patient maintain a diet of  1500 jennifer    F/up  in 1 month

## 2025-08-11 ENCOUNTER — APPOINTMENT (OUTPATIENT)
Dept: PRIMARY CARE | Facility: CLINIC | Age: 60
End: 2025-08-11
Payer: COMMERCIAL

## 2025-08-21 ENCOUNTER — APPOINTMENT (OUTPATIENT)
Dept: PRIMARY CARE | Facility: CLINIC | Age: 60
End: 2025-08-21
Payer: COMMERCIAL

## 2025-08-21 VITALS
SYSTOLIC BLOOD PRESSURE: 103 MMHG | WEIGHT: 178 LBS | HEART RATE: 72 BPM | BODY MASS INDEX: 37.36 KG/M2 | DIASTOLIC BLOOD PRESSURE: 71 MMHG | HEIGHT: 58 IN | OXYGEN SATURATION: 97 %

## 2025-08-21 DIAGNOSIS — E66.09 OBESITY DUE TO EXCESS CALORIES WITH SERIOUS COMORBIDITY, UNSPECIFIED CLASS: ICD-10-CM

## 2025-08-21 PROCEDURE — G0447 BEHAVIOR COUNSEL OBESITY 15M: HCPCS | Performed by: FAMILY MEDICINE

## 2025-08-21 PROCEDURE — 3008F BODY MASS INDEX DOCD: CPT | Performed by: FAMILY MEDICINE

## 2025-08-21 PROCEDURE — 99213 OFFICE O/P EST LOW 20 MIN: CPT | Performed by: FAMILY MEDICINE

## 2025-09-23 ENCOUNTER — APPOINTMENT (OUTPATIENT)
Dept: PRIMARY CARE | Facility: CLINIC | Age: 60
End: 2025-09-23
Payer: COMMERCIAL

## 2025-11-06 ENCOUNTER — APPOINTMENT (OUTPATIENT)
Dept: NEUROLOGY | Facility: CLINIC | Age: 60
End: 2025-11-06
Payer: COMMERCIAL